# Patient Record
Sex: FEMALE | Race: WHITE | Employment: PART TIME | URBAN - METROPOLITAN AREA
[De-identification: names, ages, dates, MRNs, and addresses within clinical notes are randomized per-mention and may not be internally consistent; named-entity substitution may affect disease eponyms.]

---
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
## 2018-04-17 ENCOUNTER — OFFICE VISIT (OUTPATIENT)
Dept: DERMATOLOGY | Facility: CLINIC | Age: 18
End: 2018-04-17
Payer: COMMERCIAL

## 2018-04-17 DIAGNOSIS — L70.0 ACNE VULGARIS: Primary | ICD-10-CM

## 2018-04-17 PROCEDURE — 99213 OFFICE O/P EST LOW 20 MIN: CPT | Performed by: DERMATOLOGY

## 2018-04-17 RX ORDER — MINOCYCLINE HYDROCHLORIDE 100 MG/1
100 CAPSULE ORAL 2 TIMES DAILY
Qty: 220 CAPSULE | Refills: 0 | Status: SHIPPED | OUTPATIENT
Start: 2018-04-17 | End: 2018-08-16

## 2018-04-17 RX ORDER — AZELAIC ACID 0.15 G/G
GEL TOPICAL
Qty: 50 G | Refills: 11 | Status: SHIPPED | OUTPATIENT
Start: 2018-04-17 | End: 2018-06-22

## 2018-04-17 ASSESSMENT — PAIN SCALES - GENERAL: PAINLEVEL: NO PAIN (0)

## 2018-04-17 NOTE — PROGRESS NOTES
[facility] Dermatology Note      Dermatology Problem List:  1. Acne vulgaris, moderate, inflammatory  -s/p proactive with temporary improvement  -regular menses, currently on OCPs  -s/p doxycycline initiated 12/15/2015  -s/p Differin initiated 12/15/2015  -s/p BP wash initiated 12/15/2015  -s/p clindamycin initiated 12/15/2015  - tretinoin initiated 9/6/2016    Encounter Date: Apr 17, 2018    CC:  Chief Complaint   Patient presents with     Acne     OTC BP wash, retin-A       History of Present Illness:  Ms. Laurie Morales is a 17 year old female who presents as a follow-up for acne vulgaris. The patient was last seen 12/2/2016 when the patient was treated with tretinoin cream and BPO wash and accutane was discussed as she has already had a course of accutane. The patient used OTC BPO wash and retin A, but not frequently. The patient has used differin in the past, the patient reports that she is taking birth control now and was taking at the time she was seeing Dr. Clifford last time,, but took a break until now for The patient improved after taking doxy previously. No hx of depression, no suicide attempt. Face irritated by tretinoin and BP. The patient reports no other lesions of concern.       Past Medical History:   Patient Active Problem List   Diagnosis     Mild intermittent asthma     Impetigo     Goiter     Acne     Past Medical History:   Diagnosis Date     Nonsuppurative otitis media, not specified as acute or chronic 09/17/1999    Recurrent otitis media     Unspecified disease of respiratory system 09/17/1999    RAD     Past Surgical History:   Procedure Laterality Date     HC CREATE EARDRUM OPENING,GEN ANESTH      P.E. [id number]       Social History:  The patient is a student.      Family History:  There is no family history of skin cancer.  No family history of clotting disorder.   No family history of breast cancer.     Medications:  Current Outpatient Prescriptions   Medication Sig  Dispense Refill     VENTOLIN  (90 BASE) MCG/ACT Inhaler INHALE TWO PUFFS BY MOUTH EVERY 6 HOURS AS NEEDED FOR SHORTNESS OF BREATH 18 g 0     tretinoin (RETIN-A) 0.025 % cream Apply a thin layer once daily to the face 45 g 11     TRI-LO-ESTARYLLA 0.18/0.215/0.25 MG-25 MCG per tablet TAKE ONE TABLET BY MOUTH EVERY DAY 84 tablet 1     clindamycin (CLEOCIN T) 1 % solution Apply a thin layer to the face once daily in the am (Patient not taking: Reported on 4/17/2018) 60 mL 11     doxycycline Monohydrate 100 MG CAPS Take 1 capsule (100 mg) by mouth 2 times daily (Patient not taking: Reported on 4/17/2018) 180 capsule 0     adapalene (DIFFERIN) 0.1 % cream Apply a pea sized amount to the face nightly. (Patient not taking: Reported on 4/17/2018) 45 g 3       Allergies   Allergen Reactions     No Known Allergies          Review of Systems:.   -Skin: As above in HPI. No additional skin concerns.  -Constitutional: The patient is feeling generally well.     Physical exam:  There were no vitals taken for this visit.  GEN: This is a well developed, well-nourished female in no acute distress, in a pleasant mood.    SKIN: Acne exam, which includes the face, neck, upper central chest, and upper central back was performed.  - Acneiform papules and patches on the back, chest, and hairline and the peroral area.   -No other lesions of concern on areas examined.     Impression/Plan:  1. Acne vulgaris, moderate, inflammatory, s/p doxycycline but rebounded, away from  Home this summer, will go ahead with minocycline(declines doxy due to sun exposure) and plan to start accutane this fall. She is already on OCPs  Hold tretinoin 0.025% cream to face and clindamycin, irritating and not working  Start azaleic acid 15% cream once daily.   Continue OCPs  Start cerave or any oil free or non comedogenic make up/moisturizer   Start minocycline 100 mg BID after completing current course of antibiotics. Side effects of stomach upset, headache,  dizziness, and dyspigmentation were discussed.  Rare side effects such as allergic reactions, hepatitis and lupus-like syndrome were also discussed.  If patient experiences any unexplained symptoms of illness while taking minocycline, the medication is to be stopped and the prescribing physician contacted immediately.    Discussed need to stop medication and contact clinic if she were to become pregnant and that medication is pregnancy category D and can cause bony/teeth defects.   Introductory yellow brochure given.       Follow-up in 4 months for baseline accutane assessment, earlier for new or changing lesions.     Staff Involved:  Scribe/Staff    Scribe Disclosure:   I, Otilia Pereira, am serving as a scribe to document services personally performed by Dr. Parisa Clifford, based on data collection and the provider's statements to me.       Provider Disclosure:   The documentation recorded by the scribe accurately reflects the services I personally performed and the decisions made by me.    Parisa Clifford MD    Department of Dermatology  [facility]: Phone: [phone], Fax:[phone]  [facility]: Phone: [phone], Fax: [phone]

## 2018-04-17 NOTE — MR AVS SNAPSHOT
After Visit Summary   4/17/2018    Laurie Morales    MRN: [id number]           Patient Information     Date Of Birth          2000        Visit Information        Provider Department      4/17/2018 3:15 PM Parisa Clifford MD [facility]        Today's Diagnoses     Acne vulgaris    -  1      Care Instructions    Facial Moisturizing Lotion AM with SPF 30     Avobenzone, Octocrylene,-Dotson ingredients              Follow-ups after your visit        Follow-up notes from your care team     Return in about 4 months (around 8/17/2018) for plan for accutane start.      Your next 10 appointments already scheduled     Aug 24, 2018  8:30 AM CDT   [facility] Visit with Parias Clifford MD   [facility]    [address] [address] [place] [address] [phone]              Who to contact     If you have questions or need follow up information about today's clinic visit or your schedule please contact [facility] directly at [phone].  Normal or non-critical lab and imaging results will be communicated to you by MyChart, letter or phone within 4 business days after the clinic has received the results. If you do not hear from us within 7 days, please contact the clinic through [employer] or phone. If you have a critical or abnormal lab result, we will notify you by phone as soon as possible.  Submit refill requests through [employer] or call your pharmacy and they will forward the refill request to us. Please allow 3 business days for your refill to be completed.          Additional Information About Your Visit        MyChart Information     [employer] gives you secure access to your electronic health record. If you see a primary care provider, you can also send messages to your care team and make appointments. If you have questions, please call your primary care clinic.  If you do not have a primary care provider, please call [phone]  and they will assist you.      [employer] is an electronic gateway that provides easy, online access to your medical records. With [employer], you can request a clinic appointment, read your test results, renew a prescription or communicate with your care team.     To access your existing account, please contact your [facility] or call [phone] for assistance.        Care EveryWhere ID     This is your Care EveryWhere ID. This could be used by other organizations to access your [place] medical records  [id number]         Blood Pressure from Last 3 Encounters:   03/29/16 108/72   10/19/15 110/68   07/20/15 116/62    Weight from Last 3 Encounters:   03/29/16 53.5 kg (118 lb) (41 %)*   10/19/15 49.9 kg (109 lb 14.4 oz) (25 %)*   07/20/15 51.3 kg (113 lb) (33 %)*     * Growth percentiles are based on [facility] 2-20 Years data.              Today, you had the following     No orders found for display         Today's Medication Changes          These changes are accurate as of 4/17/18  3:45 PM.  If you have any questions, ask your nurse or doctor.               Start taking these medicines.        Dose/Directions    Azelaic Acid 15 % gel   Used for:  Acne vulgaris   Started by:  Parisa Clifford MD        Apply a thin layer to the face once daily.   Quantity:  50 g   Refills:  11       minocycline 100 MG capsule   Commonly known as:  MINOCIN/DYNACIN   Used for:  Acne vulgaris   Started by:  Parisa Clifford MD        Dose:  100 mg   Take 1 capsule (100 mg) by mouth 2 times daily   Quantity:  220 capsule   Refills:  0         Stop taking these medicines if you haven't already. Please contact your care team if you have questions.     clindamycin 1 % solution   Commonly known as:  CLEOCIN T   Stopped by:  Parisa Clifford MD           doxycycline monohydrate 100 MG capsule   Stopped by:  Parisa Clifford MD           tretinoin 0.025 % cream   Commonly known as:  RETIN-A   Stopped by:  Parisa Clifford MD                 Where to get your medicines      These medications were sent to [facility] - [place], [place] - [address], [address]  [address], [facility] [address]     Phone:  [phone]     Azelaic Acid 15 % gel    minocycline 100 MG capsule                Primary Care Provider Office Phone # Fax #    sIidra Bullard, PADDY [phone] [address] [place] [address]        Equal Access to Services     [facility]: Hadii aad ku hadasho Soomaali, waaxda luqadaha, qaybta kaalmada adeegyada, waxay idiin hayaan adeeg kharash lanichole . So [facility] [phone].    ATENCIÓN: Si habla español, tiene a harrison disposición servicios gratuitos de asistencia lingüística. [facility] [phone].    We comply with applicable federal civil rights laws and [place] laws. We do not discriminate on the basis of race, color, national origin, age, disability, sex, sexual orientation, or gender identity.            Thank you!     Thank you for choosing [facility]  for your care. Our goal is always to provide you with excellent care. Hearing back from our patients is one way we can continue to improve our services. Please take a few minutes to complete the written survey that you may receive in the mail after your visit with us. Thank you!             Your Updated Medication List - Protect others around you: Learn how to safely use, store and throw away your medicines at www.disposemymeds.org.          This list is accurate as of 4/17/18  3:45 PM.  Always use your most recent med list.                   Brand Name Dispense Instructions for use Diagnosis    adapalene 0.1 % cream    DIFFERIN    45 g    Apply a pea sized amount to the face nightly.    Acne vulgaris       Azelaic Acid 15 % gel     50 g    Apply a thin layer to the face once daily.    Acne vulgaris       minocycline 100 MG capsule    MINOCIN/DYNACIN    220 capsule    Take 1 capsule (100 mg) by mouth  2 times daily    Acne vulgaris       TRI-LO-ESTARYLLA 0.18/0.215/0.25 MG-25 MCG per tablet   Generic drug:  norgestim-eth estrad triphasic     84 tablet    TAKE ONE TABLET BY MOUTH EVERY DAY    Acne vulgaris       VENTOLIN  (90 Base) MCG/ACT Inhaler   Generic drug:  albuterol     18 g    INHALE TWO PUFFS BY MOUTH EVERY 6 HOURS AS NEEDED FOR SHORTNESS OF BREATH    Mild intermittent asthma without complication

## 2018-04-17 NOTE — NURSING NOTE
Dermatology Rooming Note    Laurie Morales's goals for this visit include:   Chief Complaint   Patient presents with     Acne     OTC BP wash, retin-A       Is a scribe okay for this visit:YES    Are records needed for this visit(If yes, obtain release of information): No     Vitals: There were no vitals taken for this visit.    Referring Provider:  No referring provider defined for this encounter.    Shanita Alvarado LPN

## 2018-04-24 ENCOUNTER — TELEPHONE (OUTPATIENT)
Dept: DERMATOLOGY | Facility: CLINIC | Age: 18
End: 2018-04-24

## 2018-04-24 DIAGNOSIS — L70.0 ACNE VULGARIS: ICD-10-CM

## 2018-04-24 RX ORDER — CLINDAMYCIN PHOSPHATE 11.9 MG/ML
SOLUTION TOPICAL
Qty: 60 ML | Refills: 11 | Status: SHIPPED | OUTPATIENT
Start: 2018-04-24 | End: 2020-08-27

## 2018-04-24 NOTE — TELEPHONE ENCOUNTER
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  Ashley Stevens, [facility]  [phone]

## 2018-04-24 NOTE — TELEPHONE ENCOUNTER
Disp Refills Start End ANGIE   clindamycin (CLEOCIN T) 1 % external solution (Discontinued) 60 mL 11 12/15/2015 3/15/2016 --   Sig: Apply a thin layer to the face once daily in the am   Class: E-Prescribe     Ok for rn to refill

## 2018-06-18 ENCOUNTER — TELEPHONE (OUTPATIENT)
Dept: FAMILY MEDICINE | Facility: OTHER | Age: 18
End: 2018-06-18

## 2018-06-18 NOTE — TELEPHONE ENCOUNTER
Pt is scheduled to see Airam Aleisha on 6/21/18, pt has never seen provider and she is not est with provider either. Due to closed practice pt needs to be rescheduled. Will route to provider's team to reach out to pt as this was scheduled via [employer].    Sarina Jacobs CMA ([facility])

## 2018-06-19 NOTE — PROGRESS NOTES
SUBJECTIVE:   Laurie Morales is a 17 year old female who presents to clinic today for the following health issues:      HPI     Discuss reoccurring UTIs and yeast infections  Did finish antibiotics on Wednesday and feels like UTI is gone but maybe getting a yeast infection-is having some itching and then prior to menstrual cycle starting Wednesday did notice discharge that was not normal to her.  No odor.  Has has multiple uti's and subsequent yeast infections.  Worried about traveling to see boyfriend and having another uti.        Problem list and histories reviewed & adjusted, as indicated.  Additional history: as documented      Patient Active Problem List   Diagnosis     Mild intermittent asthma     Impetigo     Goiter     Acne     Past Surgical History:   Procedure Laterality Date     HC CREATE EARDRUM OPENING,GEN ANESTH      P.E. [id number]       Social History   Substance Use Topics     Smoking status: Never Smoker     Smokeless tobacco: Never Used     Alcohol use No     Family History   Problem Relation Age of Onset     Cancer Paternal Grandfather      prostate     HEART DISEASE Maternal Grandfather      CAB age 63     Cerebrovascular Disease Maternal Grandfather      Anesthesia Reaction No family hx of          Current Outpatient Prescriptions   Medication Sig Dispense Refill     clindamycin (CLEOCIN T) 1 % solution Apply a thin layer to the face once daily in the am 60 mL 11     terconazole (TERAZOL 7) 0.4 % cream Place 1 applicatorful in vagina qhs x 7 days 45 g 0     TRI-LO-ESTARYLLA 0.18/0.215/0.25 MG-25 MCG per tablet TAKE ONE TABLET BY MOUTH EVERY DAY 84 tablet 1     VENTOLIN  (90 BASE) MCG/ACT Inhaler INHALE TWO PUFFS BY MOUTH EVERY 6 HOURS AS NEEDED FOR SHORTNESS OF BREATH 18 g 0     adapalene (DIFFERIN) 0.1 % cream Apply a pea sized amount to the face nightly. (Patient not taking: Reported on 6/22/2018) 45 g 3     minocycline (MINOCIN/DYNACIN) 100 MG capsule Take 1 capsule (100 mg) by  "mouth 2 times daily (Patient not taking: Reported on 6/22/2018) 220 capsule 0     Allergies   Allergen Reactions     No Known Allergies      BP Readings from Last 3 Encounters:   06/22/18 112/60   03/29/16 108/72   10/19/15 110/68    Wt Readings from Last 3 Encounters:   06/22/18 119 lb (54 kg) (33 %)*   03/29/16 118 lb (53.5 kg) (41 %)*   10/19/15 109 lb 14.4 oz (49.9 kg) (25 %)*     * Growth percentiles are based on [facility] 2-20 Years data.             Discussed with patient different risk factors for uti's and discussed ways of helping to prevent them including increased water intake, vit c supplementation daily, voiding after intercourse. Also discussed with patient options of homeopathic medications for uti's to take when traveling. Discussed treatments and causes of yeast infections including causes such as antibiotic use. Total face to face discussion time was 20 minutes.          ROS:  CONSTITUTIONAL: NEGATIVE for fever, chills, change in weight  ENT/MOUTH: NEGATIVE for ear, mouth and throat problems  RESP: NEGATIVE for significant cough or SOB  : normal menstrual cycles, vaginal itching    OBJECTIVE:     /60  Pulse 72  Temp 98.6  F (37  C) (Temporal)  Resp 12  Ht 5' 5.25\" (1.657 m)  Wt 119 lb (54 kg)  LMP 06/20/2018  BMI 19.65 kg/m2  Body mass index is 19.65 kg/(m^2).     Well appearing female in nad  Remainder of exam deferred      ASSESSMENT/PLAN:     17 y.o. G0 with history of frequent uti's, now s/p 20 minutes of counseling regarding etiologies and prevention. History consistent with vaginal candidiasis after antibiotic use. Rx for terazol given. RTC prn and in 1 year. STD testing today for GC/C.      Amanda Carrillo MD  [facility]  "

## 2018-06-22 ENCOUNTER — OFFICE VISIT (OUTPATIENT)
Dept: OBGYN | Facility: OTHER | Age: 18
End: 2018-06-22
Payer: COMMERCIAL

## 2018-06-22 VITALS
TEMPERATURE: 98.6 F | DIASTOLIC BLOOD PRESSURE: 60 MMHG | SYSTOLIC BLOOD PRESSURE: 112 MMHG | RESPIRATION RATE: 12 BRPM | HEIGHT: 65 IN | BODY MASS INDEX: 19.83 KG/M2 | WEIGHT: 119 LBS | HEART RATE: 72 BPM

## 2018-06-22 DIAGNOSIS — Z11.3 SCREEN FOR STD (SEXUALLY TRANSMITTED DISEASE): Primary | ICD-10-CM

## 2018-06-22 DIAGNOSIS — B37.31 CANDIDIASIS OF VAGINA: ICD-10-CM

## 2018-06-22 PROCEDURE — 99202 OFFICE O/P NEW SF 15 MIN: CPT | Performed by: OBSTETRICS & GYNECOLOGY

## 2018-06-22 PROCEDURE — 87591 N.GONORRHOEAE DNA AMP PROB: CPT | Performed by: OBSTETRICS & GYNECOLOGY

## 2018-06-22 PROCEDURE — 87491 CHLMYD TRACH DNA AMP PROBE: CPT | Performed by: OBSTETRICS & GYNECOLOGY

## 2018-06-22 RX ORDER — TERCONAZOLE 0.4 %
CREAM WITH APPLICATOR VAGINAL
Qty: 45 G | Refills: 0 | Status: SHIPPED | OUTPATIENT
Start: 2018-06-22 | End: 2020-08-27

## 2018-06-22 ASSESSMENT — PAIN SCALES - GENERAL: PAINLEVEL: NO PAIN (0)

## 2018-06-22 NOTE — MR AVS SNAPSHOT
After Visit Summary   6/22/2018    Laurie Morales    MRN: [id number]           Patient Information     Date Of Birth          2000        Visit Information        Provider Department      6/22/2018 11:30 AM Amanda Carrillo MD [facility]        Today's Diagnoses     Screen for STD (sexually transmitted disease)    -  1    Candidiasis of vagina           Follow-ups after your visit        Your next 10 appointments already scheduled     Aug 16, 2018  2:30 PM CDT   Return Visit with Parisa Clifford MD   [facility] ([facility])    [address] [place] [address] [phone]              Who to contact     If you have questions or need follow up information about today's clinic visit or your schedule please contact [facility] directly at [phone].  Normal or non-critical lab and imaging results will be communicated to you by MyChart, letter or phone within 4 business days after the clinic has received the results. If you do not hear from us within 7 days, please contact the clinic through [employer] or phone. If you have a critical or abnormal lab result, we will notify you by phone as soon as possible.  Submit refill requests through [employer] or call your pharmacy and they will forward the refill request to us. Please allow 3 business days for your refill to be completed.          Additional Information About Your Visit        MyChart Information     [employer] gives you secure access to your electronic health record. If you see a primary care provider, you can also send messages to your care team and make appointments. If you have questions, please call your primary care clinic.  If you do not have a primary care provider, please call [phone] and they will assist you.        Care EveryWhere ID     This is your Care EveryWhere ID. This could be used by other organizations to access your [facility]  "records  [id number]        Your Vitals Were     Pulse Temperature Respirations Height Last Period BMI (Body Mass Index)    72 98.6  F (37  C) (Temporal) 12 5' 5.25\" (1.657 m) 06/20/2018 19.65 kg/m2       Blood Pressure from Last 3 Encounters:   06/22/18 112/60   03/29/16 108/72   10/19/15 110/68    Weight from Last 3 Encounters:   06/22/18 119 lb (54 kg) (33 %)*   03/29/16 118 lb (53.5 kg) (41 %)*   10/19/15 109 lb 14.4 oz (49.9 kg) (25 %)*     * Growth percentiles are based on [facility] 2-20 Years data.              We Performed the Following     Chlamydia trachomatis PCR     Neisseria gonorrhoeae PCR          Today's Medication Changes          These changes are accurate as of 6/22/18  2:06 PM.  If you have any questions, ask your nurse or doctor.               Start taking these medicines.        Dose/Directions    terconazole 0.4 % cream   Commonly known as:  TERAZOL 7   Used for:  Candidiasis of vagina   Started by:  Amanda Carrillo MD        Place 1 applicatorful in vagina qhs x 7 days   Quantity:  45 g   Refills:  0            Where to get your medicines      These medications were sent to [facility] - [address]   [address] [place] [address]     Phone:  [phone]     terconazole 0.4 % cream                Primary Care Provider Fax #    Physician No Ref-Primary [phone]       No address on file        Equal Access to Services     [facility]: Hadii aad ku hadasho Sotoñoali, waaxda luqadaha, qaybta kaalmada adeegyada, bing diaz. So [facility] [phone].    ATENCIÓN: Si habla español, tiene a harrison disposición servicios gratuitos de asistencia lingüística. Llame al [phone].    We comply with applicable federal civil rights laws and [place] laws. We do not discriminate on the basis of race, color, national origin, age, disability, sex, sexual orientation, or gender identity.            Thank you!     Thank you for " choosing [facility]  for your care. Our goal is always to provide you with excellent care. Hearing back from our patients is one way we can continue to improve our services. Please take a few minutes to complete the written survey that you may receive in the mail after your visit with us. Thank you!             Your Updated Medication List - Protect others around you: Learn how to safely use, store and throw away your medicines at www.disposemymeds.org.          This list is accurate as of 6/22/18  2:06 PM.  Always use your most recent med list.                   Brand Name Dispense Instructions for use Diagnosis    adapalene 0.1 % cream    DIFFERIN    45 g    Apply a pea sized amount to the face nightly.    Acne vulgaris       clindamycin 1 % solution    CLEOCIN T    60 mL    Apply a thin layer to the face once daily in the am    Acne vulgaris       minocycline 100 MG capsule    MINOCIN/DYNACIN    220 capsule    Take 1 capsule (100 mg) by mouth 2 times daily    Acne vulgaris       terconazole 0.4 % cream    TERAZOL 7    45 g    Place 1 applicatorful in vagina qhs x 7 days    Candidiasis of vagina       TRI-LO-ESTARYLLA 0.18/0.215/0.25 MG-25 MCG per tablet   Generic drug:  norgestim-eth estrad triphasic     84 tablet    TAKE ONE TABLET BY MOUTH EVERY DAY    Acne vulgaris       VENTOLIN  (90 Base) MCG/ACT Inhaler   Generic drug:  albuterol     18 g    INHALE TWO PUFFS BY MOUTH EVERY 6 HOURS AS NEEDED FOR SHORTNESS OF BREATH    Mild intermittent asthma without complication

## 2018-06-23 ASSESSMENT — ASTHMA QUESTIONNAIRES: ACT_TOTALSCORE: 25

## 2018-06-24 LAB
C TRACH DNA SPEC QL NAA+PROBE: NEGATIVE
N GONORRHOEA DNA SPEC QL NAA+PROBE: NEGATIVE
SPECIMEN SOURCE: NORMAL
SPECIMEN SOURCE: NORMAL

## 2018-06-27 ENCOUNTER — TELEPHONE (OUTPATIENT)
Dept: OBGYN | Facility: OTHER | Age: 18
End: 2018-06-27

## 2018-06-27 NOTE — TELEPHONE ENCOUNTER
Notes Recorded by Guillermo James RN on 6/27/2018 at 9:13 AM  Huddled with Dr. Rivera.  Results are normal. Please call patient.  Guillermo James RN, BSN    Pt informed and no questions/concerns.    Sarina Jacobs CMA ([facility])

## 2018-08-16 ENCOUNTER — OFFICE VISIT (OUTPATIENT)
Dept: DERMATOLOGY | Facility: CLINIC | Age: 18
End: 2018-08-16
Payer: COMMERCIAL

## 2018-08-16 VITALS — WEIGHT: 118.5 LBS | BODY MASS INDEX: 19.57 KG/M2

## 2018-08-16 DIAGNOSIS — L70.0 ACNE VULGARIS: ICD-10-CM

## 2018-08-16 DIAGNOSIS — Z76.89 ENCOUNTER PRIOR TO INITIATION OF MEDICATION: Primary | ICD-10-CM

## 2018-08-16 LAB — BETA HCG QUAL IFA URINE: NEGATIVE

## 2018-08-16 PROCEDURE — 99213 OFFICE O/P EST LOW 20 MIN: CPT | Performed by: DERMATOLOGY

## 2018-08-16 PROCEDURE — 84703 CHORIONIC GONADOTROPIN ASSAY: CPT | Performed by: DERMATOLOGY

## 2018-08-16 NOTE — LETTER
8/16/2018         RE: Laurie Morales  [address] [facility] [address]        Dear Colleague,    Thank you for referring your patient, Laurie Morales, to the [facility]. Please see a copy of my visit note below.    [facility] Dermatology Note      Dermatology Problem List:  1. Acne vulgaris, moderate, inflammatory  -s/p proactive with temporary improvement  -regular menses, currently on OCPs  -minocycline spring 2018  -s/p doxycycline initiated 12/15/2015   -s/p Differin initiated 12/15/2015  -s/p BP wash initiated 12/15/2015  -s/p clindamycin initiated 12/15/2015  - tretinoin initiated 9/6/2016    Encounter Date: Aug 16, 2018    CC:  Chief Complaint   Patient presents with     Acne     Accutane start - menses started Wensday       History of Present Illness:  Ms. Laurie Morales is a 17 year old female who presents as a follow-up for acne vulgaris. The patient was last seen 4/17/18 at which time she started minocycline to treat her acne. Today, the patient reports that she wants to start accutane. She finished the course of minocycline and no longer uses Differin. She still uses birth control. She will use condoms as second protection. No hx of depression. Is sexually active. NO history of SI, Mood is good. No other skin concerns to be addressed.      Past Medical History:   Patient Active Problem List   Diagnosis     Mild intermittent asthma     Impetigo     Goiter     Acne     Past Medical History:   Diagnosis Date     Nonsuppurative otitis media, not specified as acute or chronic 09/17/1999    Recurrent otitis media     Unspecified disease of respiratory system 09/17/1999    RAD     Past Surgical History:   Procedure Laterality Date     HC CREATE EARDRUM OPENING,GEN ANESTH      P.E. [id number]       Social History:  Social History     Social History     Marital status: Single     Spouse name: N/A     Number of children: N/A     Years of education: N/A      Occupational History     Not on file.     Social History Main Topics     Smoking status: Never Smoker     Smokeless tobacco: Never Used     Alcohol use No     Drug use: No     Sexual activity: Not Currently     Partners: Male     Birth control/ protection: Pill, Condom     Other Topics Concern     Bike Helmet Yes     Seat Belt Yes     Social History Narrative     The patient is a student.  Studies geography.     Family History:  Family History   Problem Relation Age of Onset     Cancer Paternal Grandfather      prostate     HEART DISEASE Maternal Grandfather      CAB age 63     Cerebrovascular Disease Maternal Grandfather      Anesthesia Reaction No family hx of      There is no family history of skin cancer.  No family history of clotting disorder.   No family history of breast cancer.   No fam history of psychiatric disease.     Medications:  Current Outpatient Prescriptions   Medication Sig Dispense Refill     adapalene (DIFFERIN) 0.1 % cream Apply a pea sized amount to the face nightly. (Patient not taking: Reported on 6/22/2018) 45 g 3     clindamycin (CLEOCIN T) 1 % solution Apply a thin layer to the face once daily in the am 60 mL 11     minocycline (MINOCIN/DYNACIN) 100 MG capsule Take 1 capsule (100 mg) by mouth 2 times daily (Patient not taking: Reported on 6/22/2018) 220 capsule 0     terconazole (TERAZOL 7) 0.4 % cream Place 1 applicatorful in vagina qhs x 7 days 45 g 0     TRI-LO-ESTARYLLA 0.18/0.215/0.25 MG-25 MCG per tablet TAKE ONE TABLET BY MOUTH EVERY DAY 84 tablet 1     VENTOLIN  (90 BASE) MCG/ACT Inhaler INHALE TWO PUFFS BY MOUTH EVERY 6 HOURS AS NEEDED FOR SHORTNESS OF BREATH 18 g 0       Allergies   Allergen Reactions     No Known Allergies          Review of Systems:.   -Skin: As above in HPI. No additional skin concerns.  -Constitutional: The patient is feeling generally well.     Physical exam:  Wt 53.8 kg (118 lb 8 oz)  BMI 19.57 kg/m2  GEN: This is a well developed,  well-nourished female in no acute distress, in a pleasant mood.    SKIN: Acne exam, which includes the face, neck, upper central chest, and upper central back was performed  -Upper chest w/ upper comedone  -Acneiform scarring around the perioral area  -About 20 acneiform papules on the upper back  -Some open comedones on the face  -No other lesions of concern on areas examined.     Impression/Plan:  1. Acne vulgaris, moderate, inflammatory. Patient still wants to start Accutane, has failed oral antibiotics and OCPs. Has scarring  Discussion of the risks and side effects of Accutane including but not limited to mucocutaneous dryness, arthralgias, myalgias, depression, suicidal ideation, headache, blurred vision,  increase in liver function tests, increase in lipids, and teratogenic effects. Discussed need for two forms of contraception. The ipledgeprogram brochure was provided and the contents discussed with the patient. The patient was counseled they cannot give blood while on Accutane. No personal or family history of inflammatory bowel disease or hypertriglyceridemia known to patient. Reviewed need to avoid alcohol on medication.   Ipledge program consent was obtained. Patient counseled that if they wear contacts eye may become too dry to tolerate. Recommend follow up with eye doctor.   At this visit we will begin Accutane 20 mg daily.  One month supply with no refills provided.  Goal dose is 6456 to 8070 mg for 120-150 mg/kg dosing in this 53.8 kg patient.    Baseline labs including CBC, BUN/Cr, fasting lipids and alt/ast will be obtained.     Baseline pregnancy test today. The patients two forms of contraception are birth control  and condoms.     Total cumulative dose 0 mg.   Patient's I-pledge # is [phone]  The patient will stop all acne medications       Follow-up in 30 days, earlier for new or changing lesions.     Staff Involved:  Scribe/Staff    Scribe Disclosure  I, Gerber Patrick, am serving as a scribe  to document services personally performed by Dr. Parisa Clifford MD, based on data collection and the provider's statements to me.          Provider Disclosure:   The documentation recorded by the scribe accurately reflects the services I personally performed and the decisions made by me.    Parisa Clifford MD    Department of Dermatology  [facility]: Phone: [phone], Fax:[phone]  [facility]: Phone: [phone], Fax: [phone]        Again, thank you for allowing me to participate in the care of your patient.        Sincerely,        Parisa Clifford MD

## 2018-08-16 NOTE — NURSING NOTE
@Laurie Morales's goals for this visit include:   Chief Complaint   Patient presents with     Acne     Accutane start - menses started Wensday       She requests these members of her care team be copied on today's visit information: NO    PCP: No Ref-Primary, Physician    Referring Provider:  No referring provider defined for this encounter.    There were no vitals taken for this visit.    Do you need any medication refills at today's visit? NO    Laxmi Hawkins, [facility]

## 2018-08-16 NOTE — MR AVS SNAPSHOT
After Visit Summary   8/16/2018    Laurie Morales    MRN: [id number]           Patient Information     Date Of Birth          2000        Visit Information        Provider Department      8/16/2018 2:30 PM Parisa Clifford MD [facility]        Today's Diagnoses     Encounter prior to initiation of medication    -  1    Acne vulgaris           Follow-ups after your visit        Follow-up notes from your care team     Return in about 30 months (around 2/16/2021) for Accutane.      Future tests that were ordered for you today     Open Standing Orders        Priority Remaining Interval Expires Ordered    Beta HCG qual IFA urine Routine 7/8 8/16/2019 8/16/2018          Open Future Orders        Priority Expected Expires Ordered    CBC with platelets differential Routine  8/16/2019 8/16/2018    Comprehensive metabolic panel Routine  8/16/2019 8/16/2018    Lipid Profile Routine  8/16/2019 8/16/2018            Who to contact     If you have questions or need follow up information about today's clinic visit or your schedule please contact [facility] directly at [phone].  Normal or non-critical lab and imaging results will be communicated to you by [employer], letter or phone within 4 business days after the clinic has received the results. If you do not hear from us within 7 days, please contact the clinic through [employer] or phone. If you have a critical or abnormal lab result, we will notify you by phone as soon as possible.  Submit refill requests through [employer] or call your pharmacy and they will forward the refill request to us. Please allow 3 business days for your refill to be completed.          Additional Information About Your Visit        [employer] Information     [employer] gives you secure access to your electronic health record. If you see a primary care provider, you can also send messages to your care team and make appointments. If you have questions, please  call your primary care clinic.  If you do not have a primary care provider, please call [phone] and they will assist you.      [employer] is an electronic gateway that provides easy, online access to your medical records. With [employer], you can request a clinic appointment, read your test results, renew a prescription or communicate with your care team.     To access your existing account, please contact your [facility] or call [phone] for assistance.        Care EveryWhere ID     This is your Care EveryWhere ID. This could be used by other organizations to access your [facility] medical records  [id number]        Your Vitals Were     BMI (Body Mass Index)                   19.57 kg/m2            Blood Pressure from Last 3 Encounters:   06/22/18 112/60   03/29/16 108/72   10/19/15 110/68    Weight from Last 3 Encounters:   08/16/18 53.8 kg (118 lb 8 oz) (31 %)*   06/22/18 54 kg (119 lb) (33 %)*   03/29/16 53.5 kg (118 lb) (41 %)*     * Growth percentiles are based on [facility] 2-20 Years data.              We Performed the Following     Beta HCG qual IFA urine          Today's Medication Changes          These changes are accurate as of 8/16/18  4:40 PM.  If you have any questions, ask your nurse or doctor.               Stop taking these medicines if you haven't already. Please contact your care team if you have questions.     adapalene 0.1 % cream   Commonly known as:  DIFFERIN           minocycline 100 MG capsule   Commonly known as:  MINOCIN/DYNACIN                    Primary Care Provider Fax #    Physician No Ref-Primary [phone]       No address on file        Equal Access to Services     ELISABET DOE : Hadii milad valle Sorick, waaxda luqadaha, qaybta kaalmada bing holm . So [facility] [place] [phone].    ATENCIÓN: Si habla español, tiene a harrison disposición servicios gratuitos de asistencia lingüística. Llame al [phone].    We comply  with applicable federal civil rights laws and [place] laws. We do not discriminate on the basis of race, color, national origin, age, disability, sex, sexual orientation, or gender identity.            Thank you!     Thank you for choosing [facility]  for your care. Our goal is always to provide you with excellent care. Hearing back from our patients is one way we can continue to improve our services. Please take a few minutes to complete the written survey that you may receive in the mail after your visit with us. Thank you!             Your Updated Medication List - Protect others around you: Learn how to safely use, store and throw away your medicines at www.disposemymeds.org.          This list is accurate as of 8/16/18  4:40 PM.  Always use your most recent med list.                   Brand Name Dispense Instructions for use Diagnosis    clindamycin 1 % solution    CLEOCIN T    60 mL    Apply a thin layer to the face once daily in the am    Acne vulgaris       terconazole 0.4 % cream    TERAZOL 7    45 g    Place 1 applicatorful in vagina qhs x 7 days    Candidiasis of vagina       TRI-LO-ESTARYLLA 0.18/0.215/0.25 MG-25 MCG per tablet   Generic drug:  norgestim-eth estrad triphasic     84 tablet    TAKE ONE TABLET BY MOUTH EVERY DAY    Acne vulgaris       VENTOLIN  (90 Base) MCG/ACT inhaler   Generic drug:  albuterol     18 g    INHALE TWO PUFFS BY MOUTH EVERY 6 HOURS AS NEEDED FOR SHORTNESS OF BREATH    Mild intermittent asthma without complication

## 2018-08-16 NOTE — LETTER
Patient:  Laurie Morales  :   2000  MRN:     [id number]        Ms.Caitlin TYRON Morales  [address] [address] [place] [id number]        2018    Dear ,    We are writing to inform you of your test results that show negative pregnancy test.     Thank you for taking the time to be seen in our dermatology clinic. If you have further questions or concerns, please contact the clinic(see phone number listed below).       Sincerely,     Parisa Clifford MD      Department of Dermatology   [facility]: Phone: [phone], Fax:[phone]   [facility]: Phone: [phone], Fax: [phone]     Resulted Orders   Beta HCG qual IFA urine   Result Value Ref Range    Beta HCG Qual IFA Urine Negative NEG^Negative

## 2019-01-29 NOTE — PROGRESS NOTES
[facility] Dermatology Note      Dermatology Problem List:  1. Acne vulgaris, moderate, inflammatory  -s/p proactive with temporary improvement  -regular menses, currently on OCPs  -minocycline spring 2018  -s/p doxycycline initiated 12/15/2015   -s/p Differin initiated 12/15/2015  -s/p BP wash initiated 12/15/2015  -s/p clindamycin initiated 12/15/2015  - tretinoin initiated 9/6/2016    Encounter Date: Aug 16, 2018    CC:  Chief Complaint   Patient presents with     Acne     Accutane start - menses started Wensday       History of Present Illness:  Ms. Laurie Morales is a 17 year old female who presents as a follow-up for acne vulgaris. The patient was last seen 4/17/18 at which time she started minocycline to treat her acne. Today, the patient reports that she wants to start accutane. She finished the course of minocycline and no longer uses Differin. She still uses birth control. She will use condoms as second protection. No hx of depression. Is sexually active. NO history of SI, Mood is good. No other skin concerns to be addressed.      Past Medical History:   Patient Active Problem List   Diagnosis     Mild intermittent asthma     Impetigo     Goiter     Acne     Past Medical History:   Diagnosis Date     Nonsuppurative otitis media, not specified as acute or chronic 09/17/1999    Recurrent otitis media     Unspecified disease of respiratory system 09/17/1999    RAD     Past Surgical History:   Procedure Laterality Date     HC CREATE EARDRUM OPENING,GEN ANESTH      P.E. [id number]       Social History:  Social History     Social History     Marital status: Single     Spouse name: N/A     Number of children: N/A     Years of education: N/A     Occupational History     Not on file.     Social History Main Topics     Smoking status: Never Smoker     Smokeless tobacco: Never Used     Alcohol use No     Drug use: No     Sexual activity: Not Currently     Partners: Male     Birth control/  protection: Pill, Condom     Other Topics Concern     Bike Helmet Yes     Seat Belt Yes     Social History Narrative     The patient is a student.  Studies geography.     Family History:  Family History   Problem Relation Age of Onset     Cancer Paternal Grandfather      prostate     HEART DISEASE Maternal Grandfather      CAB age 63     Cerebrovascular Disease Maternal Grandfather      Anesthesia Reaction No family hx of      There is no family history of skin cancer.  No family history of clotting disorder.   No family history of breast cancer.   No fam history of psychiatric disease.     Medications:  Current Outpatient Prescriptions   Medication Sig Dispense Refill     adapalene (DIFFERIN) 0.1 % cream Apply a pea sized amount to the face nightly. (Patient not taking: Reported on 6/22/2018) 45 g 3     clindamycin (CLEOCIN T) 1 % solution Apply a thin layer to the face once daily in the am 60 mL 11     minocycline (MINOCIN/DYNACIN) 100 MG capsule Take 1 capsule (100 mg) by mouth 2 times daily (Patient not taking: Reported on 6/22/2018) 220 capsule 0     terconazole (TERAZOL 7) 0.4 % cream Place 1 applicatorful in vagina qhs x 7 days 45 g 0     TRI-LO-ESTARYLLA 0.18/0.215/0.25 MG-25 MCG per tablet TAKE ONE TABLET BY MOUTH EVERY DAY 84 tablet 1     VENTOLIN  (90 BASE) MCG/ACT Inhaler INHALE TWO PUFFS BY MOUTH EVERY 6 HOURS AS NEEDED FOR SHORTNESS OF BREATH 18 g 0       Allergies   Allergen Reactions     No Known Allergies          Review of Systems:.   -Skin: As above in HPI. No additional skin concerns.  -Constitutional: The patient is feeling generally well.     Physical exam:  Wt 53.8 kg (118 lb 8 oz)  BMI 19.57 kg/m2  GEN: This is a well developed, well-nourished female in no acute distress, in a pleasant mood.    SKIN: Acne exam, which includes the face, neck, upper central chest, and upper central back was performed  -Upper chest w/ upper comedone  -Acneiform scarring around the perioral area  -About  20 acneiform papules on the upper back  -Some open comedones on the face  -No other lesions of concern on areas examined.     Impression/Plan:  1. Acne vulgaris, moderate, inflammatory. Patient still wants to start Accutane, has failed oral antibiotics and OCPs. Has scarring  Discussion of the risks and side effects of Accutane including but not limited to mucocutaneous dryness, arthralgias, myalgias, depression, suicidal ideation, headache, blurred vision,  increase in liver function tests, increase in lipids, and teratogenic effects. Discussed need for two forms of contraception. The ipledgeprogram brochure was provided and the contents discussed with the patient. The patient was counseled they cannot give blood while on Accutane. No personal or family history of inflammatory bowel disease or hypertriglyceridemia known to patient. Reviewed need to avoid alcohol on medication.   Ipledge program consent was obtained. Patient counseled that if they wear contacts eye may become too dry to tolerate. Recommend follow up with eye doctor.   At this visit we will begin Accutane 20 mg daily.  One month supply with no refills provided.  Goal dose is 6456 to 8070 mg for 120-150 mg/kg dosing in this 53.8 kg patient.    Baseline labs including CBC, BUN/Cr, fasting lipids and alt/ast will be obtained.     Baseline pregnancy test today. The patients two forms of contraception are birth control  and condoms.     Total cumulative dose 0 mg.   Patient's I-pledge # is [id number]  The patient will stop all acne medications       Follow-up in 30 days, earlier for new or changing lesions.     Staff Involved:  Scribe/Staff    Scribe Disclosure  I, Gerber Patrick, am serving as a scribe to document services personally performed by Dr. Parisa Clifford MD, based on data collection and the provider's statements to me.          Provider Disclosure:   The documentation recorded by the scribe accurately reflects the services I personally performed  and the decisions made by me.    Parisa Clifford MD    Department of Dermatology  [facility]: Phone: [phone], Fax:[phone]  [facility]: Phone: [phone], Fax: [phone]       TOMEKA:'[id number]'

## 2019-10-24 ENCOUNTER — OFFICE VISIT (OUTPATIENT)
Dept: DERMATOLOGY | Facility: CLINIC | Age: 19
End: 2019-10-24
Payer: COMMERCIAL

## 2019-10-24 DIAGNOSIS — L70.0 ACNE VULGARIS: Primary | ICD-10-CM

## 2019-10-24 DIAGNOSIS — L70.0 ACNE VULGARIS: ICD-10-CM

## 2019-10-24 DIAGNOSIS — Z51.81 MEDICATION MONITORING ENCOUNTER: ICD-10-CM

## 2019-10-24 LAB
ALBUMIN SERPL-MCNC: 3.8 G/DL (ref 3.4–5)
ALP SERPL-CCNC: 41 U/L (ref 40–150)
ALT SERPL W P-5'-P-CCNC: 16 U/L (ref 0–50)
ANION GAP SERPL CALCULATED.3IONS-SCNC: 8 MMOL/L (ref 3–14)
AST SERPL W P-5'-P-CCNC: 10 U/L (ref 0–45)
B-HCG SERPL-ACNC: <1 IU/L (ref 0–5)
BASOPHILS # BLD AUTO: 0 10E9/L (ref 0–0.2)
BASOPHILS NFR BLD AUTO: 0.4 %
BILIRUB SERPL-MCNC: 0.3 MG/DL (ref 0.2–1.3)
BUN SERPL-MCNC: 9 MG/DL (ref 7–30)
CALCIUM SERPL-MCNC: 8.9 MG/DL (ref 8.5–10.1)
CHLORIDE SERPL-SCNC: 106 MMOL/L (ref 94–109)
CHOLEST SERPL-MCNC: 170 MG/DL
CO2 SERPL-SCNC: 24 MMOL/L (ref 20–32)
CREAT SERPL-MCNC: 0.75 MG/DL (ref 0.52–1.04)
DIFFERENTIAL METHOD BLD: NORMAL
EOSINOPHIL # BLD AUTO: 0.1 10E9/L (ref 0–0.7)
EOSINOPHIL NFR BLD AUTO: 0.7 %
ERYTHROCYTE [DISTWIDTH] IN BLOOD BY AUTOMATED COUNT: 14.5 % (ref 10–15)
GFR SERPL CREATININE-BSD FRML MDRD: >90 ML/MIN/{1.73_M2}
GLUCOSE SERPL-MCNC: 88 MG/DL (ref 70–99)
HCG UR QL: NEGATIVE
HCT VFR BLD AUTO: 39.1 % (ref 35–47)
HDLC SERPL-MCNC: 75 MG/DL
HGB BLD-MCNC: 12.3 G/DL (ref 11.7–15.7)
IMM GRANULOCYTES # BLD: 0 10E9/L (ref 0–0.4)
IMM GRANULOCYTES NFR BLD: 0.1 %
LDLC SERPL CALC-MCNC: 78 MG/DL
LYMPHOCYTES # BLD AUTO: 2.5 10E9/L (ref 0.8–5.3)
LYMPHOCYTES NFR BLD AUTO: 32.4 %
MCH RBC QN AUTO: 26.8 PG (ref 26.5–33)
MCHC RBC AUTO-ENTMCNC: 31.5 G/DL (ref 31.5–36.5)
MCV RBC AUTO: 85 FL (ref 78–100)
MONOCYTES # BLD AUTO: 0.4 10E9/L (ref 0–1.3)
MONOCYTES NFR BLD AUTO: 5.2 %
NEUTROPHILS # BLD AUTO: 4.7 10E9/L (ref 1.6–8.3)
NEUTROPHILS NFR BLD AUTO: 61.2 %
NONHDLC SERPL-MCNC: 95 MG/DL
NRBC # BLD AUTO: 0 10*3/UL
NRBC BLD AUTO-RTO: 0 /100
PLATELET # BLD AUTO: 215 10E9/L (ref 150–450)
POTASSIUM SERPL-SCNC: 3.8 MMOL/L (ref 3.4–5.3)
PROT SERPL-MCNC: 7.5 G/DL (ref 6.8–8.8)
RBC # BLD AUTO: 4.59 10E12/L (ref 3.8–5.2)
SODIUM SERPL-SCNC: 138 MMOL/L (ref 133–144)
TRIGL SERPL-MCNC: 86 MG/DL
WBC # BLD AUTO: 7.6 10E9/L (ref 4–11)

## 2019-10-24 ASSESSMENT — PAIN SCALES - GENERAL: PAINLEVEL: MILD PAIN (2)

## 2019-10-24 NOTE — PATIENT INSTRUCTIONS
Labs today.  Return in 1 month for repeat pregnancy test and then can start accutane.    Side effects of Accutane: lip and skin dryness, joint aches, muscle aches, depression, suicidal ideation, headache, blurred vision, GI upset, increase in liver function tests, increase in lipids, and birth defects if you were to get pregnant.

## 2019-10-24 NOTE — NURSING NOTE
Chief Complaint   Patient presents with     Acne     Patient is here today for acne.      Michelle Kumar CMA

## 2019-10-24 NOTE — PROGRESS NOTES
[facility] Dermatology Note      Dermatology Problem List:  1. Acne vulgaris, moderate, inflammatory  - initiated Accutane process 10/24/19   - Previous Tx: doxycycline, Differin, BP wash, clindamycin, tretinoin, minocycline, proactive  - regular menses, currently on OCPs    Encounter Date: Oct 24, 2019    CC:  Chief Complaint   Patient presents with     Acne     Patient is here today for acne.          History of Present Illness:  Ms. Laurie Morales is a 18 year old female who presents today for evaluation of acne. The patient was last seen by Dr. Clifford on 8/16/19 when starting Accutane was discussed. Today she reports she was unable to follow up on her acne due to moving away from campus. Now she is living close to the [facility] again which makes treatment easier. She has been struggling with increased breakouts lately. Doesn't think acne is correlated with her menstrual cycle. She is not using any prescription topicals. Currently uses Cetaphil face wash and moisturizer. She has tried many topicals in the past including tretinoin, clindamycin, differin. Benzoyl peroxide was too harsh for her skin. Also tried minocycline and doxycyline and prefers not to get on an antibiotic again. She is on an oral contraceptive right now. Expresses interest in starting Accutane today. The patient is otherwise feeling well. There are no other skin concerns at this time.      Past Medical History:   Patient Active Problem List   Diagnosis     Mild intermittent asthma     Impetigo     Goiter     Acne     Past Medical History:   Diagnosis Date     Nonsuppurative otitis media, not specified as acute or chronic 09/17/1999    Recurrent otitis media     Unspecified disease of respiratory system 09/17/1999    RAD     Past Surgical History:   Procedure Laterality Date     HC CREATE EARDRUM OPENING,GEN ANESTH      P.E. [id number]       Social History:  Patient reports that she has never smoked. She has never used smokeless  tobacco. She reports that she does not drink alcohol or use drugs.    Family History:  Family History   Problem Relation Age of Onset     Cancer Paternal Grandfather         prostate     Heart Disease Maternal Grandfather         CAB age 63     Cerebrovascular Disease Maternal Grandfather      Anesthesia Reaction No family hx of        Medications:  Current Outpatient Medications   Medication Sig Dispense Refill     TRI-LO-ESTARYLLA 0.18/0.215/0.25 MG-25 MCG per tablet TAKE ONE TABLET BY MOUTH EVERY DAY 84 tablet 1     VENTOLIN  (90 BASE) MCG/ACT Inhaler INHALE TWO PUFFS BY MOUTH EVERY 6 HOURS AS NEEDED FOR SHORTNESS OF BREATH 18 g 0     clindamycin (CLEOCIN T) 1 % solution Apply a thin layer to the face once daily in the am (Patient not taking: Reported on 10/24/2019) 60 mL 11     terconazole (TERAZOL 7) 0.4 % cream Place 1 applicatorful in vagina qhs x 7 days (Patient not taking: Reported on 10/24/2019) 45 g 0       Allergies   Allergen Reactions     No Known Allergies        Review of Systems:  -Skin Establ Pt: The patient denies any new rash, pruritus, or lesions that are symptomatic, changing or bleeding, except as per HPI.  -Constitutional: The patient is feeling generally well.    Physical exam:  GEN: This is a well developed, well-nourished female in no acute distress, in a pleasant mood.    SKIN: Acne exam, which includes the face, neck, upper central chest, and upper central back was performed.  - Superifical acneiform papules with intermixed open and closed comedones on the face, upper chest, and back. Most prominent on the forehead and lower chin.    - Many hyperpigmented macules scattered on the back.   - No other lesions of concern on areas examined.     Impression/Plan:  1. Acne vulgaris, predominantly inflammatory. Discussed options including resuming topicals with temporary antibiotic use but patient has essentially done this and failed. Given prior failure, discussed accutane which patient  is interested in pursuing.  Will start isotretinoin 40 mg next month. Goal dose is 120-150mg/kg. Will get weight next month.   Method of contraception includes: OCP and condoms.  Discussion of the risks and side effects of isotretinoin including but not limited to mucocutaneous dryness, arthralgias, myalgias, depression, suicidal ideation, headache, blurred vision, increase in liver function test and increase in lipids. The iPledge program brochure was provided and the contents discussed with the patient. The patient was counseled that they cannot give blood while on isotretinoin. Advised against tattoos and waxing. No personal or family history of inflammatory bowel disease or hypertriglyceridemia known to patient. Reviewed need to avoid alcohol on medication. The iPledge program consent was obtained. Patient counseled that if they wear contacts, the eyes may become too dry to tolerate. Recommend follow up with eye doctor if this occurs.    Discussed need for sun protection, at least SPF 30+.  Baseline labs today -hCG, CBC, CMP, fasting lipids.  The patient will stop all other acne medications next month.  Total dose: 0mg/kg    Follow-up in 1 month.       Staff Involved:    Scribe Disclosure  I, Ishan Walker, am serving as a scribe to document services personally performed by Dr. Kenzie Reece, based on data collection and the provider's statements to me.     Provider Disclosure:   The documentation recorded by the scribe accurately reflects the services I personally performed and the decisions made by me.    Kenzie Reece MD    Department of Dermatology  [facility]: Phone: [phone], Fax: [phone]

## 2019-10-24 NOTE — LETTER
10/24/2019       RE: Laurie Morales  [address] [place] [address]     Dear Colleague,    Thank you for referring your patient, Laurie Morales, to the [facility] DERMATOLOGY at [facility]. Please see a copy of my visit note below.    [facility] Dermatology Note      Dermatology Problem List:  1. Acne vulgaris, moderate, inflammatory  - initiated Accutane process 10/24/19   - Previous Tx: doxycycline, Differin, BP wash, clindamycin, tretinoin, minocycline, proactive  - regular menses, currently on OCPs    Encounter Date: Oct 24, 2019    CC:  Chief Complaint   Patient presents with     Acne     Patient is here today for acne.          History of Present Illness:  Ms. Laurie Morales is a 18 year old female who presents today for evaluation of acne. The patient was last seen by Dr. Clifford on 8/16/19 when starting Accutane was discussed. Today she reports she was unable to follow up on her acne due to moving away from campus. Now she is living close to the [facility] which makes treatment easier. She has been struggling with increased breakouts lately. Doesn't think acne is correlated with her menstrual cycle. She is not using any prescription topicals. Currently uses Cetaphil face wash and moisturizer. She has tried many topicals in the past including tretinoin, clindamycin, differin. Benzoyl peroxide was too harsh for her skin. Also tried minocycline and doxycyline and prefers not to get on an antibiotic again. She is on an oral contraceptive right now. Expresses interest in starting Accutane today. The patient is otherwise feeling well. There are no other skin concerns at this time.      Past Medical History:   Patient Active Problem List   Diagnosis     Mild intermittent asthma     Impetigo     Goiter     Acne     Past Medical History:   Diagnosis Date     Nonsuppurative otitis media, not specified as acute or chronic 09/17/1999    Recurrent otitis  media     Unspecified disease of respiratory system 09/17/1999    RAD     Past Surgical History:   Procedure Laterality Date     HC CREATE EARDRUM OPENING,GEN ANESTH      P.E. [id number]       Social History:  Patient reports that she has never smoked. She has never used smokeless tobacco. She reports that she does not drink alcohol or use drugs.    Family History:  Family History   Problem Relation Age of Onset     Cancer Paternal Grandfather         prostate     Heart Disease Maternal Grandfather         CAB age 63     Cerebrovascular Disease Maternal Grandfather      Anesthesia Reaction No family hx of        Medications:  Current Outpatient Medications   Medication Sig Dispense Refill     TRI-LO-ESTARYLLA 0.18/0.215/0.25 MG-25 MCG per tablet TAKE ONE TABLET BY MOUTH EVERY DAY 84 tablet 1     VENTOLIN  (90 BASE) MCG/ACT Inhaler INHALE TWO PUFFS BY MOUTH EVERY 6 HOURS AS NEEDED FOR SHORTNESS OF BREATH 18 g 0     clindamycin (CLEOCIN T) 1 % solution Apply a thin layer to the face once daily in the am (Patient not taking: Reported on 10/24/2019) 60 mL 11     terconazole (TERAZOL 7) 0.4 % cream Place 1 applicatorful in vagina qhs x 7 days (Patient not taking: Reported on 10/24/2019) 45 g 0       Allergies   Allergen Reactions     No Known Allergies        Review of Systems:  -Skin Establ Pt: The patient denies any new rash, pruritus, or lesions that are symptomatic, changing or bleeding, except as per HPI.  -Constitutional: The patient is feeling generally well.    Physical exam:  GEN: This is a well developed, well-nourished female in no acute distress, in a pleasant mood.    SKIN: Acne exam, which includes the face, neck, upper central chest, and upper central back was performed.  - Superifical acneiform papules with intermixed open and closed comedones on the face, upper chest, and back. Most prominent on the forehead and lower chin.    - Many hyperpigmented macules scattered on the back.   - No other  lesions of concern on areas examined.     Impression/Plan:  1. Acne vulgaris, predominantly inflammatory. Discussed options including resuming topicals with temporary antibiotic use but patient has essentially done this and failed. Given prior failure, discussed accutane which patient is interested in pursuing.  Will start isotretinoin 40 mg next month. Goal dose is 120-150mg/kg. Will get weight next month.   Method of contraception includes: OCP and condoms.  Discussion of the risks and side effects of isotretinoin including but not limited to mucocutaneous dryness, arthralgias, myalgias, depression, suicidal ideation, headache, blurred vision, increase in liver function test and increase in lipids. The iPledge program brochure was provided and the contents discussed with the patient. The patient was counseled that they cannot give blood while on isotretinoin. Advised against tattoos and waxing. No personal or family history of inflammatory bowel disease or hypertriglyceridemia known to patient. Reviewed need to avoid alcohol on medication. The iPledge program consent was obtained. Patient counseled that if they wear contacts, the eyes may become too dry to tolerate. Recommend follow up with eye doctor if this occurs.    Discussed need for sun protection, at least SPF 30+.  Baseline labs today -hCG, CBC, CMP, fasting lipids.  The patient will stop all other acne medications next month.  Total dose: 0mg/kg    Follow-up in 1 month.       Staff Involved:    Scribe Disclosure  I, Ishan Walker, am serving as a scribe to document services personally performed by Dr. Kenzie Reece, based on data collection and the provider's statements to me.     Provider Disclosure:   The documentation recorded by the scribe accurately reflects the services I personally performed and the decisions made by me.    Kenzie Reece MD    Department of Dermatology  [facility]  [facility]: Phone: [phone], Fax: [phone]      Accutane Intake:  Ipledge number:[id number]  Copy of consent retained for medical record:Yes  Baseline and future lab orders placed:Yes  Baseline weight obtained:Yes  Patient registered:Yes

## 2019-10-25 NOTE — PROGRESS NOTES
Accutane Intake:  Ipledge number:[id number]  Copy of consent retained for medical record:Yes  Baseline and future lab orders placed:Yes  Baseline weight obtained:Yes  Patient registered:Yes

## 2019-10-26 ENCOUNTER — HEALTH MAINTENANCE LETTER (OUTPATIENT)
Age: 19
End: 2019-10-26

## 2019-11-26 ENCOUNTER — OFFICE VISIT (OUTPATIENT)
Dept: DERMATOLOGY | Facility: CLINIC | Age: 19
End: 2019-11-26
Payer: COMMERCIAL

## 2019-11-26 DIAGNOSIS — Z79.899 ENCOUNTER FOR LONG-TERM (CURRENT) USE OF HIGH-RISK MEDICATION: ICD-10-CM

## 2019-11-26 DIAGNOSIS — L70.0 ACNE VULGARIS: Primary | ICD-10-CM

## 2019-11-26 LAB — HCG UR QL: NEGATIVE

## 2019-11-26 RX ORDER — ISOTRETINOIN 30 MG/1
CAPSULE ORAL
Qty: 30 CAPSULE | Refills: 0 | Status: SHIPPED | OUTPATIENT
Start: 2019-11-26 | End: 2020-08-27

## 2019-11-26 NOTE — NURSING NOTE
Dermatology Rooming Note    Laurie Morales's goals for this visit include:   Chief Complaint   Patient presents with     Derm Problem     Laurie is here to start accutane.      Scarlett Palacios LPN

## 2019-11-26 NOTE — PROGRESS NOTES
Accutane Intake:  Ipledge number:[id number]  Copy of consent retained for medical record:Yes  Baseline and future lab orders placed:Yes  Baseline weight obtained:Yes  Patient registered:Yes            [facility] Dermatology Note        Dermatology Problem List:  1. Acne vulgaris, moderate, inflammatory  - initiated Accutane process 10/24/19   -starting isotretinoin 30mg daily 11/26/19  - Previous Tx: doxycycline, Differin, BP wash, clindamycin, tretinoin, minocycline, proactive  - regular menses, currently on OCPs     Encounter Date: Nov 26, 2019     CC:  Follow up on acne, starting isotretinoin     History of Present Illness:  Ms. Laurie Morales is a 19 year old female who presents today for followup of acne. The patient was last seen by Dr. Reece on 10/24/19 when starting Accutane was discussed, and screening baseline labs were obtained.   Now she is living close to the [facility] again which makes treatment easier. She has been struggling with increased breakouts lately. Doesn't think acne is correlated with her menstrual cycle. She is not using any prescription topicals. Currently uses Cetaphil face wash and moisturizer. She has tried many topicals in the past including tretinoin, clindamycin, differin. Benzoyl peroxide was too harsh for her skin. Also tried minocycline and doxycyline and prefers not to get on an antibiotic again. She is on an oral contraceptive right now. Expresses interest in starting Accutane today. The patient is otherwise feeling well. There are no other skin concerns at this time.   Acne stable vs last visit, involves face and chest, but not back.     Past Medical History:       Patient Active Problem List   Diagnosis     Mild intermittent asthma     Impetigo     Goiter     Acne      Past Medical History        Past Medical History:   Diagnosis Date     Nonsuppurative otitis media, not specified as acute or chronic 09/17/1999     Recurrent otitis media     Unspecified  disease of respiratory system 09/17/1999     RAD         Past Surgical History         Past Surgical History:   Procedure Laterality Date     HC CREATE EARDRUM OPENING,GEN ANESTH         P.E. [id number]            Social History:  Patient reports that she has never smoked. She has never used smokeless tobacco. She reports that she does not drink alcohol or use drugs.       Review of Systems:  -Skin Establ Pt: The patient denies any new rash, pruritus, or lesions that are symptomatic, changing or bleeding, except as per HPI.  -Constitutional: The patient is feeling generally well.  10 point ROS otherwise negative     Physical exam:  GEN: This is a well developed, well-nourished female in no acute distress, in a pleasant mood.    SKIN: Acne exam, which includes the face, neck, upper central chest, and upper central back was performed.  - Superifical acneiform papules with intermixed open and closed comedones on the face, upper chest, and back. Most prominent on the forehead and lower chin.    - Many hyperpigmented macules scattered on the back.   - No other lesions of concern on areas examined.      Impression/Plan:  1. Acne vulgaris, predominantly inflammatory. Patient to start isotretinoin today.     Will start isotretinoin 30 mg daily. Goal dose is 220mg/kg. Weighs 120 lb (54.5kg)     Method of contraception includes: OCP and condoms.    UPT today then monthly    Discussion of the risks and side effects of isotretinoin including but not limited to mucocutaneous dryness, arthralgias, myalgias, depression, suicidal ideation, headache, blurred vision, increase in liver function test and increase in lipids. The iPledge program brochure was provided and the contents discussed with the patient. The patient was counseled that they cannot give blood while on isotretinoin. Advised against tattoos and waxing. No personal or family history of inflammatory bowel disease or hypertriglyceridemia known to patient. Reviewed need  to avoid alcohol on medication. The iPledge program consent was obtained. Patient counseled that if they wear contacts, the eyes may become too dry to tolerate. Recommend follow up with eye doctor if this occurs.      Discussed need for sun protection, at least SPF 30+.    Baseline labs at last visit -hCG, CBC, CMP, fasting lipids - all WNL    Stop all other acne medications.    Total dose: 0mg/kg     Follow-up in 1 month.           Epi Lott MD  Dermatology Attending

## 2019-11-26 NOTE — LETTER
11/26/2019       RE: Laurie Morales  [address] [place] [address]     Dear Colleague,    Thank you for referring your patient, Laurie Morales, to the [facility] DERMATOLOGY at [facility]. Please see a copy of my visit note below.    Accutane Intake:  Ipledge number:[id number]  Copy of consent retained for medical record:Yes  Baseline and future lab orders placed:Yes  Baseline weight obtained:Yes  Patient registered:Yes            [facility] Dermatology Note        Dermatology Problem List:  1. Acne vulgaris, moderate, inflammatory  - initiated Accutane process 10/24/19   -starting isotretinoin 30mg daily 11/26/19  - Previous Tx: doxycycline, Differin, BP wash, clindamycin, tretinoin, minocycline, proactive  - regular menses, currently on OCPs     Encounter Date:  Nov 26, 2019     CC:  Follow up on acne, starting isotretinoin     History of Present Illness:  Ms. Laurie Morales is a [age] year old female who presents today for followup of acne. The patient was last seen by Dr. Reece on 10/24/19 when starting Accutane was discussed, and screening baseline labs were obtained.   Now she is living close to the [facility] again which makes treatment easier. She has been struggling with increased breakouts lately. Doesn't think acne is correlated with her menstrual cycle. She is not using any prescription topicals. Currently uses Cetaphil face wash and moisturizer. She has tried many topicals in the past including tretinoin, clindamycin, differin. Benzoyl peroxide was too harsh for her skin. Also tried minocycline and doxycyline and prefers not to get on an antibiotic again. She is on an oral contraceptive right now. Expresses interest in starting Accutane today. The patient is otherwise feeling well. There are no other skin concerns at this time.   Acne stable vs last visit, involves face and chest, but not back.     Past Medical History:       Patient Active  Problem List   Diagnosis     Mild intermittent asthma     Impetigo     Goiter     Acne      Past Medical History        Past Medical History:   Diagnosis Date     Nonsuppurative otitis media, not specified as acute or chronic 09/17/1999     Recurrent otitis media     Unspecified disease of respiratory system 09/17/1999     RAD         Past Surgical History         Past Surgical History:   Procedure Laterality Date     HC CREATE EARDRUM OPENING,GEN ANESTH         P.E. [id number]            Social History:  Patient reports that she has never smoked. She has never used smokeless tobacco. She reports that she does not drink alcohol or use drugs.       Review of Systems:  -Skin Establ Pt: The patient denies any new rash, pruritus, or lesions that are symptomatic, changing or bleeding, except as per HPI.  -Constitutional: The patient is feeling generally well.  10 point ROS otherwise negative     Physical exam:  GEN: This is a well developed, well-nourished female in no acute distress, in a pleasant mood.    SKIN: Acne exam, which includes the face, neck, upper central chest, and upper central back was performed.  - Superifical acneiform papules with intermixed open and closed comedones on the face, upper chest, and back. Most prominent on the forehead and lower chin.    - Many hyperpigmented macules scattered on the back.   - No other lesions of concern on areas examined.      Impression/Plan:  1. Acne vulgaris, predominantly inflammatory. Patient to start isotretinoin today.     Will start isotretinoin  30 mg  daily. Goal dose is 220mg/kg. Weighs 120 lb (54.5kg)     Method of contraception includes: OCP and condoms.    UPT today then monthly    Discussion of the risks and side effects of isotretinoin including but not limited to mucocutaneous dryness, arthralgias, myalgias, depression, suicidal ideation, headache, blurred vision, increase in liver function test and increase in lipids. The iPledge program brochure was  provided and the contents discussed with the patient. The patient was counseled that they cannot give blood while on isotretinoin. Advised against tattoos and waxing. No personal or family history of inflammatory bowel disease or hypertriglyceridemia known to patient. Reviewed need to avoid alcohol on medication. The iPledge program consent was obtained. Patient counseled that if they wear contacts, the eyes may become too dry to tolerate. Recommend follow up with eye doctor if this occurs.      Discussed need for sun protection, at least SPF 30+.    Baseline labs at last visit -hCG, CBC, CMP, fasting lipids - all WNL    Stop all other acne medications.    Total dose: 0mg/kg     Follow-up in 1 month.           Epi Lott MD  Dermatology Attending

## 2019-12-31 ENCOUNTER — OFFICE VISIT (OUTPATIENT)
Dept: DERMATOLOGY | Facility: CLINIC | Age: 19
End: 2019-12-31
Payer: COMMERCIAL

## 2019-12-31 DIAGNOSIS — Z79.899 ENCOUNTER FOR LONG-TERM CURRENT USE OF HIGH RISK MEDICATION: ICD-10-CM

## 2019-12-31 DIAGNOSIS — L70.0 ACNE VULGARIS: Primary | ICD-10-CM

## 2019-12-31 DIAGNOSIS — Z79.899 ENCOUNTER FOR LONG-TERM (CURRENT) USE OF HIGH-RISK MEDICATION: ICD-10-CM

## 2019-12-31 LAB — HCG UR QL: NEGATIVE

## 2019-12-31 RX ORDER — ISOTRETINOIN 40 MG/1
40 CAPSULE ORAL DAILY
Qty: 30 CAPSULE | Refills: 0 | Status: SHIPPED | OUTPATIENT
Start: 2019-12-31 | End: 2020-08-27

## 2019-12-31 ASSESSMENT — PAIN SCALES - GENERAL: PAINLEVEL: NO PAIN (0)

## 2019-12-31 NOTE — PROGRESS NOTES
Accutane Intake:  Ipledge number:[id number]  Copy of consent retained for medical record:Yes  Baseline and future lab orders placed:Yes  Baseline weight obtained:Yes  Patient registered:Yes               [facility] Dermatology Note        Dermatology Problem List:  1. Acne vulgaris, moderate, inflammatory  - initiated Accutane process 10/24/19   -started isotretinoin 30mg daily 11/26/19  - Previous Tx: doxycycline, Differin, BP wash, clindamycin, tretinoin, minocycline, proactive  - regular menses, currently on OCPs     Encounter Date: Dec 31, 2019     CC:  Follow up on acne, started isotretinoin 11/26     History of Present Illness:  Ms. Laurie Morales is a 19 year old female who presents today for followup of acne. The patient was last seen11/26/19 when starting Accutane was started at 30mg daily.     Is tolerating medication well so far.  Mild dryness of skin and lips but tolerable.  Denies significant eye dryness.  Overall noticing modest improvement of acne on face/chin over the past 1-2 weeks; pleased with improvement thusfar.  No new areas of involvement, no major flareups since starting.       Social History:  Patient reports that she has never smoked. She has never used smokeless tobacco. She reports that she does not drink alcohol or use drugs.        Review of Systems:  -Skin Establ Pt: The patient denies any new rash, pruritus, or lesions that are symptomatic, changing or bleeding, except as per HPI.  -Constitutional: The patient is feeling generally well.  10 point ROS otherwise negative     Physical exam:  GEN: This is a well developed, well-nourished female in no acute distress, in a pleasant mood.    SKIN: Acne exam, which includes the face, neck, bilateral upper extremities  - Superifical acneiform papules with intermixed open and closed comedones on the face, upper chest, and back. Most prominent on the forehead and lower chin.    - No other lesions of concern on areas examined.       Impression/Plan:  1. Acne vulgaris, predominantly inflammatory. Patient started isotretinoin one month ago, tolerating well without significant side effects.     Increase isotretinoin to 40 mg daily. Goal dose is 220mg/kg. Weighs 120 lb (54.5kg)     Method of contraception includes: OCP and condoms.    UPT today was negative; continue monthly    Discussion of the risks and side effects of isotretinoin including but not limited to mucocutaneous dryness, arthralgias, myalgias, depression, suicidal ideation, headache, blurred vision, increase in liver function test and increase in lipids. The iPledge program brochure was provided and the contents discussed with the patient. The patient was counseled that they cannot give blood while on isotretinoin. Advised against tattoos and waxing. No personal or family history of inflammatory bowel disease or hypertriglyceridemia known to patient. Reviewed need to avoid alcohol on medication. The iPledge program consent was obtained. Patient counseled that if they wear contacts, the eyes may become too dry to tolerate. Recommend follow up with eye doctor if this occurs.      Discussed need for sun protection, at least SPF 30+.    Baseline labs at last visit -hCG, CBC, CMP, fasting lipids - all WNL    Repeat LFTs and lipids after 2-3 months of therapy    Total dose to date: 900mg/kg     Follow-up in 1 month.             Epi Lott MD  Dermatology Attending

## 2019-12-31 NOTE — PATIENT INSTRUCTIONS
Gentlest face wash:  Purpose     If skin is really dry, use a facial moisturizer such as:  Cetaphil or Neutrogena

## 2019-12-31 NOTE — LETTER
12/31/2019    RE: Laurie Morales  [address] [place] [address]     Dear Colleague,    Thank you for referring your patient, Laurie oMrales, to the [facility] DERMATOLOGY at [facility]. Please see a copy of my visit note below.    Accutane Intake:  Ipledge number:[id number]  Copy of consent retained for medical record:Yes  Baseline and future lab orders placed:Yes  Baseline weight obtained:Yes  Patient registered:Yes               [facility] Dermatology Note        Dermatology Problem List:  1. Acne vulgaris, moderate, inflammatory  - initiated Accutane process 10/24/19   -started isotretinoin 30mg daily 11/26/19  - Previous Tx: doxycycline, Differin, BP wash, clindamycin, tretinoin, minocycline, proactive  - regular menses, currently on OCPs     Encounter Date: Dec 31, 2019     CC:  Follow up on acne, started isotretinoin 11/26     History of Present Illness:  Ms. Laurie Morales is a 19 year old female who presents today for followup of acne. The patient was last seen11/26/19 when starting Accutane was started at 30mg daily.     Is tolerating medication well so far.  Mild dryness of skin and lips but tolerable.  Denies significant eye dryness.  Overall noticing modest improvement of acne on face/chin over the past 1-2 weeks; pleased with improvement thusfar.  No new areas of involvement, no major flareups since starting.       Social History:  Patient reports that she has never smoked. She has never used smokeless tobacco. She reports that she does not drink alcohol or use drugs.        Review of Systems:  -Skin Establ Pt: The patient denies any new rash, pruritus, or lesions that are symptomatic, changing or bleeding, except as per HPI.  -Constitutional: The patient is feeling generally well.  10 point ROS otherwise negative     Physical exam:  GEN: This is a well developed, well-nourished female in no acute distress, in a pleasant  mood.    SKIN: Acne exam, which includes the face, neck, bilateral upper extremities  - Superifical acneiform papules with intermixed open and closed comedones on the face, upper chest, and back. Most prominent on the forehead and lower chin.    - No other lesions of concern on areas examined.      Impression/Plan:  1. Acne vulgaris, predominantly inflammatory. Patient  started isotretinoin one month ago, tolerating well without significant side effects.     Increase isotretinoin to 40 mg daily. Goal dose is 220mg/kg. Weighs 120 lb (54.5kg)     Method of contraception includes: OCP and condoms.    UPT today was negative; continue monthly    Discussion of the risks and side effects of isotretinoin including but not limited to mucocutaneous dryness, arthralgias, myalgias, depression, suicidal ideation, headache, blurred vision, increase in liver function test and increase in lipids. The iPledge program brochure was provided and the contents discussed with the patient. The patient was counseled that they cannot give blood while on isotretinoin. Advised against tattoos and waxing. No personal or family history of inflammatory bowel disease or hypertriglyceridemia known to patient. Reviewed need to avoid alcohol on medication. The iPledge program consent was obtained. Patient counseled that if they wear contacts, the eyes may become too dry to tolerate. Recommend follow up with eye doctor if this occurs.      Discussed need for sun protection, at least SPF 30+.    Baseline labs at last visit -hCG, CBC, CMP, fasting lipids - all WNL    Repeat LFTs and lipids after 2-3 months of therapy    Total dose to date: 900mg/kg     Follow-up in 1 month.          Epi Lott MD  Dermatology Attending

## 2019-12-31 NOTE — NURSING NOTE
Dermatology Rooming Note    Laurie Morales's goals for this visit include:   Chief Complaint   Patient presents with     Accutane     Laurie is here today for an accutane follow up.      ANOOP Cheatham

## 2020-01-02 DIAGNOSIS — L70.0 ACNE VULGARIS: ICD-10-CM

## 2020-01-06 RX ORDER — ISOTRETINOIN 30 MG/1
CAPSULE ORAL
Qty: 30 CAPSULE | Refills: 0 | OUTPATIENT
Start: 2020-01-06

## 2020-01-29 DIAGNOSIS — Z79.899 ENCOUNTER FOR LONG-TERM (CURRENT) USE OF HIGH-RISK MEDICATION: ICD-10-CM

## 2020-01-29 LAB — HCG UR QL: NEGATIVE

## 2020-01-30 ENCOUNTER — OFFICE VISIT (OUTPATIENT)
Dept: DERMATOLOGY | Facility: CLINIC | Age: 20
End: 2020-01-30
Payer: COMMERCIAL

## 2020-01-30 DIAGNOSIS — Z79.899 ENCOUNTER FOR LONG-TERM (CURRENT) USE OF HIGH-RISK MEDICATION: Primary | ICD-10-CM

## 2020-01-30 DIAGNOSIS — L70.0 ACNE VULGARIS: ICD-10-CM

## 2020-01-30 RX ORDER — ISOTRETINOIN 30 MG/1
60 CAPSULE ORAL DAILY
Qty: 60 CAPSULE | Refills: 0 | Status: SHIPPED | OUTPATIENT
Start: 2020-01-30 | End: 2020-03-05

## 2020-01-30 ASSESSMENT — PAIN SCALES - GENERAL: PAINLEVEL: NO PAIN (0)

## 2020-01-30 NOTE — NURSING NOTE
Dermatology Rooming Note    Laurie Morales's goals for this visit include:   Chief Complaint   Patient presents with     Derm Problem     Accutane follow up, Laurie does not think her acne has improved.      Scarlett Palacios LPN

## 2020-01-30 NOTE — PROGRESS NOTES
Accutane Intake:  Ipledge number:[id number]  Copy of consent retained for medical record:Yes  Baseline and future lab orders placed:Yes  Baseline weight obtained:Yes  Patient registered:Yes               [facility] Dermatology Note        Dermatology Problem List:  1. Acne vulgaris, moderate, inflammatory  - initiated Accutane process 10/24/19   -started isotretinoin 30mg daily 11/26/19  - Previous Tx: doxycycline, Differin, BP wash, clindamycin, tretinoin, minocycline, proactive  - regular menses, currently on OCPs     Encounter Date: Jan 30, 2020     CC:  Follow up on acne, started isotretinoin 11/26     History of Present Illness:  Ms. Laurie Morales is a 19 year old female who presents today for followup of acne. The patient was last seen 12/31/19 when we increased her Accutane from 30mg daily to 40mg daily (now s/p 30mg x30d + 40mg x 30d = 2100mg total dose).      Is tolerating medication well.  Mild dryness of skin and lips but tolerable.  Denies significant eye dryness.  Overall noticing modest improvement of acne on face/chin over the past 1-2 weeks, but with ongoing bumps on forehead.  No new areas of involvement today.        Social History:  Patient reports that she has never smoked. She has never used smokeless tobacco. She reports that she does not drink alcohol or use drugs.        Review of Systems:  -Skin Establ Pt: The patient denies any new rash, pruritus, or lesions that are symptomatic, changing or bleeding, except as per HPI.  -Constitutional: The patient is feeling generally well.  10 point ROS otherwise negative     Physical exam:  GEN: This is a well developed, well-nourished female in no acute distress, in a pleasant mood.    SKIN: Acne exam, which includes the face, neck, bilateral upper extremities  - Superifical acneiform papules with intermixed open and closed comedones on the forehead, clustered on upper left forehead.    - No other lesions of concern on areas  examined.      Impression/Plan:  1. Acne vulgaris, predominantly inflammatory. Patient started isotretinoin two months ago, tolerating well without significant side effects.     Increase isotretinoin to 60 mg daily. Goal dose is 220mg/kg. Weighs 120 lb (54.5kg); total dose to date is 2100mg (goal dose 12,000mg)    Method of contraception includes: OCP and condoms.    UPT today was negative; continue monthly    Discussion of the risks and side effects of isotretinoin including but not limited to mucocutaneous dryness, arthralgias, myalgias, depression, suicidal ideation, headache, blurred vision, increase in liver function test and increase in lipids. The iPledge program brochure was provided and the contents discussed with the patient. The patient was counseled that they cannot give blood while on isotretinoin. Advised against tattoos and waxing. No personal or family history of inflammatory bowel disease or hypertriglyceridemia known to patient. Reviewed need to avoid alcohol on medication. The iPledge program consent was obtained. Patient counseled that if they wear contacts, the eyes may become too dry to tolerate. Recommend follow up with eye doctor if this occurs.      Discussed need for sun protection, at least SPF 30+.    Baseline labs at last visit -hCG, CBC, CMP, fasting lipids - all WNL    Repeat LFTs and lipids at next visit    Total dose to date: 2100mg/kg     Follow-up in 1 month.             Epi Lott MD  Dermatology Attending

## 2020-03-04 DIAGNOSIS — L70.0 ACNE VULGARIS: ICD-10-CM

## 2020-03-04 DIAGNOSIS — Z79.899 ENCOUNTER FOR LONG-TERM (CURRENT) USE OF HIGH-RISK MEDICATION: ICD-10-CM

## 2020-03-04 DIAGNOSIS — Z51.81 MEDICATION MONITORING ENCOUNTER: ICD-10-CM

## 2020-03-04 LAB
ALBUMIN SERPL-MCNC: 3.5 G/DL (ref 3.4–5)
ALP SERPL-CCNC: 51 U/L (ref 40–150)
ALT SERPL W P-5'-P-CCNC: 17 U/L (ref 0–50)
AST SERPL W P-5'-P-CCNC: 9 U/L (ref 0–45)
BILIRUB DIRECT SERPL-MCNC: <0.1 MG/DL (ref 0–0.2)
BILIRUB SERPL-MCNC: 0.2 MG/DL (ref 0.2–1.3)
CHOLEST SERPL-MCNC: 176 MG/DL
HCG UR QL: NEGATIVE
HDLC SERPL-MCNC: 64 MG/DL
LDLC SERPL CALC-MCNC: 92 MG/DL
NONHDLC SERPL-MCNC: 111 MG/DL
PROT SERPL-MCNC: 6.9 G/DL (ref 6.8–8.8)
TRIGL SERPL-MCNC: 98 MG/DL

## 2020-03-05 ENCOUNTER — OFFICE VISIT (OUTPATIENT)
Dept: DERMATOLOGY | Facility: CLINIC | Age: 20
End: 2020-03-05
Payer: COMMERCIAL

## 2020-03-05 DIAGNOSIS — Z79.899 ENCOUNTER FOR LONG-TERM CURRENT USE OF HIGH RISK MEDICATION: Primary | ICD-10-CM

## 2020-03-05 DIAGNOSIS — L70.0 ACNE VULGARIS: ICD-10-CM

## 2020-03-05 RX ORDER — ISOTRETINOIN 30 MG/1
60 CAPSULE ORAL DAILY
Qty: 60 CAPSULE | Refills: 0 | Status: SHIPPED | OUTPATIENT
Start: 2020-03-05 | End: 2020-04-09

## 2020-03-05 NOTE — LETTER
3/5/2020       RE: Laurie Morales  [address] [place] [place] [facility] [address]     Dear Colleague,    Thank you for referring your patient, Laurie Morales, to the [facility] DERMATOLOGY at [facility]. Please see a copy of my visit note below.    Accutane Intake:  Ipledge number:[id number]  Copy of consent retained for medical record:Yes  Baseline and future lab orders placed:Yes  Baseline weight obtained:Yes  Patient registered:Yes               [facility] Dermatology Note        Dermatology Problem List:  1. Acne vulgaris, moderate, inflammatory  - initiated Accutane process 10/24/19   -started isotretinoin 30mg daily 11/26/19  - Previous Tx: doxycycline, Differin, BP wash, clindamycin, tretinoin, minocycline, proactive  - regular menses, currently on OCPs     Encounter Date: March 5, 2020     CC:  Follow up on acne, started isotretinoin 11/26     History of Present Illness:  Ms. Laurie Morales is a 19 year old female who presents today for followup of acne. The patient was last seen 1/30/19 when we increased her Accutane from 40mg daily to 60mg daily (now s/p 30mg x30d + 40mg x 30d + 60mg x 30 d = 3900mg total dose).      Is tolerating medication well.  Mild dryness of skin and lips but tolerable.  Denies significant eye dryness.  Overall noticing improvement of overall face oiliness, but has had occasional acne breakouts on face/chin over the past 1-2 weeks, and with ongoing bumps on forehead.  No new areas of involvement today.        Social History:  Patient reports that she has never smoked. She has never used smokeless tobacco. She reports that she does not drink alcohol or use drugs.        Review of Systems:  -Skin Establ Pt: The patient denies any new rash, pruritus, or lesions that are symptomatic, changing or bleeding, except as per HPI.  -Constitutional: The patient is feeling generally well.  10 point ROS otherwise negative     Physical  exam:  GEN: This is a well developed, well-nourished female in no acute distress, in a pleasant mood.    SKIN: Acne exam, which includes the face, neck, bilateral upper extremities  - Superifical acneiform papules with intermixed open and closed comedones on the forehead, clustered on upper left forehead.    - No other lesions of concern on areas examined.      Impression/Plan:  1. Acne vulgaris, predominantly inflammatory. Patient started isotretinoin three months ago, tolerating well without significant side effects.     Continue isotretinoin at 60 mg daily. Goal dose is 220mg/kg. Weighs 120 lb (54.5kg); total dose to date is 3900mg (goal dose 12,000mg)    Method of contraception includes: OCP and condoms.    UPT yesterday was negative; continue monthly    Discussion of the risks and side effects of isotretinoin including but not limited to mucocutaneous dryness, arthralgias, myalgias, depression, suicidal ideation, headache, blurred vision, increase in liver function test and increase in lipids. The iPledge program brochure was provided and the contents discussed with the patient. The patient was counseled that they cannot give blood while on isotretinoin. Advised against tattoos and waxing. No personal or family history of inflammatory bowel disease or hypertriglyceridemia known to patient. Reviewed need to avoid alcohol on medication. The iPledge program consent was obtained. Patient counseled that if they wear contacts, the eyes may become too dry to tolerate. Recommend follow up with eye doctor if this occurs.      Discussed need for sun protection, at least SPF 30+.    Repeat labs yesterday 3/4/20  - LFT, fasting lipids - all WNL    Total dose to date: 3900mg/kg     Follow-up in 1 month.             Epi Lott MD  Dermatology Attending

## 2020-03-05 NOTE — NURSING NOTE
Dermatology Rooming Note    Laurie Morales's goals for this visit include:   Chief Complaint   Patient presents with     Derm Problem     Accutane, Laurie states she has not noticed any improvement.     Scarlett Palacios LPN

## 2020-03-05 NOTE — PROGRESS NOTES
Accutane Intake:  Ipledge number:[id number]  Copy of consent retained for medical record:Yes  Baseline and future lab orders placed:Yes  Baseline weight obtained:Yes  Patient registered:Yes               [facility] Dermatology Note        Dermatology Problem List:  1. Acne vulgaris, moderate, inflammatory  - initiated Accutane process 10/24/19   -started isotretinoin 30mg daily 11/26/19  - Previous Tx: doxycycline, Differin, BP wash, clindamycin, tretinoin, minocycline, proactive  - regular menses, currently on OCPs     Encounter Date: March 5, 2020     CC:  Follow up on acne, started isotretinoin 11/26     History of Present Illness:  Ms. Laurie Morales is a 19 year old female who presents today for followup of acne. The patient was last seen 1/30/19 when we increased her Accutane from 40mg daily to 60mg daily (now s/p 30mg x30d + 40mg x 30d + 60mg x 30 d = 3900mg total dose).      Is tolerating medication well.  Mild dryness of skin and lips but tolerable.  Denies significant eye dryness.  Overall noticing improvement of overall face oiliness, but has had occasional acne breakouts on face/chin over the past 1-2 weeks, and with ongoing bumps on forehead.  No new areas of involvement today.        Social History:  Patient reports that she has never smoked. She has never used smokeless tobacco. She reports that she does not drink alcohol or use drugs.        Review of Systems:  -Skin Establ Pt: The patient denies any new rash, pruritus, or lesions that are symptomatic, changing or bleeding, except as per HPI.  -Constitutional: The patient is feeling generally well.  10 point ROS otherwise negative     Physical exam:  GEN: This is a well developed, well-nourished female in no acute distress, in a pleasant mood.    SKIN: Acne exam, which includes the face, neck, bilateral upper extremities  - Superifical acneiform papules with intermixed open and closed comedones on the forehead, clustered on upper  left forehead.    - No other lesions of concern on areas examined.      Impression/Plan:  1. Acne vulgaris, predominantly inflammatory. Patient started isotretinoin three months ago, tolerating well without significant side effects.     Continue isotretinoin at 60 mg daily. Goal dose is 220mg/kg. Weighs 120 lb (54.5kg); total dose to date is 3900mg (goal dose 12,000mg)    Method of contraception includes: OCP and condoms.    UPT yesterday was negative; continue monthly    Discussion of the risks and side effects of isotretinoin including but not limited to mucocutaneous dryness, arthralgias, myalgias, depression, suicidal ideation, headache, blurred vision, increase in liver function test and increase in lipids. The iPledge program brochure was provided and the contents discussed with the patient. The patient was counseled that they cannot give blood while on isotretinoin. Advised against tattoos and waxing. No personal or family history of inflammatory bowel disease or hypertriglyceridemia known to patient. Reviewed need to avoid alcohol on medication. The iPledge program consent was obtained. Patient counseled that if they wear contacts, the eyes may become too dry to tolerate. Recommend follow up with eye doctor if this occurs.      Discussed need for sun protection, at least SPF 30+.    Repeat labs yesterday 3/4/20  - LFT, fasting lipids - all WNL    Total dose to date: 3900mg/kg     Follow-up in 1 month.             Epi Lott MD  Dermatology Attending

## 2020-03-15 ENCOUNTER — HEALTH MAINTENANCE LETTER (OUTPATIENT)
Age: 20
End: 2020-03-15

## 2020-04-08 DIAGNOSIS — Z79.899 ENCOUNTER FOR LONG-TERM (CURRENT) USE OF HIGH-RISK MEDICATION: ICD-10-CM

## 2020-04-08 LAB — HCG UR QL: NEGATIVE

## 2020-04-09 ENCOUNTER — VIRTUAL VISIT (OUTPATIENT)
Dept: DERMATOLOGY | Facility: CLINIC | Age: 20
End: 2020-04-09
Payer: COMMERCIAL

## 2020-04-09 ENCOUNTER — MYC MEDICAL ADVICE (OUTPATIENT)
Dept: DERMATOLOGY | Facility: CLINIC | Age: 20
End: 2020-04-09

## 2020-04-09 DIAGNOSIS — L70.0 ACNE VULGARIS: ICD-10-CM

## 2020-04-09 RX ORDER — ISOTRETINOIN 30 MG/1
60 CAPSULE ORAL DAILY
Qty: 60 CAPSULE | Refills: 0 | Status: SHIPPED | OUTPATIENT
Start: 2020-04-09 | End: 2020-05-07

## 2020-04-09 NOTE — PROGRESS NOTES
Dermatology Phone Encounter  Name of caller: ***  Reason for call: ***  Diagnosis per last MD record: ***  Last visit date: ***  When advised to return to clinic and is this scheduled? ***

## 2020-04-09 NOTE — PROGRESS NOTES
[facility] Method of transmission:  Store and Forward ((National Emergency Concerning the CORONAVIRUS (COVID 19)       Impression and Recommendations (Patient Counseled on the Following):  1. Acne vulgaris, predominantly inflammatory.  Patient started isotretinoin four months ago, tolerating well without significant side effects.  Ongoing improvement.    Continue isotretinoin at 60 mg daily. Goal dose is 220mg/kg. Weighs 120 lb (54.5kg); total dose to date is 5700mg (goal dose 12,000mg)    Method of contraception includes: OCP and condoms.    UPT yesterday was negative; continue monthly checks    Discussion of the risks and side effects of isotretinoin including but not limited to mucocutaneous dryness, arthralgias, myalgias, depression, suicidal ideation, headache, blurred vision, increase in liver function test and increase in lipids. The iPledge program brochure was provided and the contents discussed with the patient. The patient was counseled that they cannot give blood while on isotretinoin. Advised against tattoos and waxing. No personal or family history of inflammatory bowel disease or hypertriglyceridemia known to patient. Reviewed need to avoid alcohol on medication. The iPledge program consent was obtained. Patient counseled that if they wear contacts, the eyes may become too dry to tolerate. Recommend follow up with eye doctor if this occurs.      Discussed need for sun protection, at least SPF 30+.    Repeat labs 3/4/20  - LFT, fasting lipids - all WNL    Total dose to date: 5700mg/kg     Follow-up in 1 month.             Epi Lott MD  Dermatology Attending    _____________________________________________________________________________    Dermatology Problem List:  Acne vulgaris, on isotretinoin    Encounter Date: Apr 9, 2020    CC:   Chief Complaint   Patient presents with     Accutane     No concerns, reports things are going well with Accutane. Pregnancy test negative 4/8.        History of Present Illness:  I have reviewed the teledermatology information and the nursing intake corresponding to this issue. Laurie Morales is a 19 year old female who presents via teledermatology for followup on acne and isotretinoin .  Last seen 3/5/20, at which time we had her continue isotretinoin 60mg daily.  Doing well today, tolerating this dose without problems. Verified that patient continues on OCP and male condoms as two forms of birth control.  Ongoing improvement in acne, no new areas of involvement today.  Mild dryness with isotretinoin is manageable.      ROS: Patient is generally feeling well today. 10 point ROS negative.        Labs: UPT negative    Past Medical History:   Patient Active Problem List   Diagnosis     Mild intermittent asthma     Impetigo     Goiter     Acne vulgaris     Encounter for long-term current use of high risk medication     Past Medical History:   Diagnosis Date     Nonsuppurative otitis media, not specified as acute or chronic 09/17/1999    Recurrent otitis media     Unspecified disease of respiratory system 09/17/1999    RAD     Past Surgical History:   Procedure Laterality Date     HC CREATE EARDRUM OPENING,GEN ANESTH      P.E. [id number]       Social History:  nonsmoker    Family History:  Family History   Problem Relation Age of Onset     Cancer Paternal Grandfather         prostate     Heart Disease Maternal Grandfather         CAB age 63     Cerebrovascular Disease Maternal Grandfather      Anesthesia Reaction No family hx of        Medications:  Current Outpatient Medications   Medication     clindamycin (CLEOCIN T) 1 % solution     ISOtretinoin (ABSORICA) 30 MG capsule     ISOtretinoin (ABSORICA) 30 MG capsule     ISOtretinoin (ACCUTANE) 40 MG capsule     terconazole (TERAZOL 7) 0.4 % cream     TRI-LO-ESTARYLLA 0.18/0.215/0.25 MG-25 MCG per tablet     VENTOLIN  (90 BASE) MCG/ACT Inhaler     No current facility-administered medications for this  visit.           Allergies   Allergen Reactions     No Known Allergies          _____________________________________________________________________________    Teledermatology information:  - Location of patient: Home  - Patient presented as: return  - Location of teledermatologist:  [facility] DERMATOLOGY )  - Reason teledermatology is appropriate:  of National Emergency Regarding Coronavirus disease (COVID 19) Outbreak  - Image quality and interpretability: n/a  - Physician has received verbal consent for a Video/Photos Visit from the patient? n/a  - In-person dermatology visit recommendation: no  - Date of images: n/a  - Service start time:1:15  - Service end time:1:28  - Date of report: 04/09/20

## 2020-04-09 NOTE — NURSING NOTE
Chief Complaint   Patient presents with     Accutane     No concerns, reports things are going well with Accutane. Pregnancy test negative 4/8.

## 2020-04-09 NOTE — PATIENT INSTRUCTIONS
[facility] Teledermatology Visit    When should I call my doctor?    If you are worsening or not improving, please, contact us or seek urgent care as noted below.     Who should I call with questions?    [facility]: [phone]     [facility]: [phone]    If this is a medical emergency and you are unable to reach an ER, Call [phone]

## 2020-05-06 DIAGNOSIS — Z79.899 ENCOUNTER FOR LONG-TERM (CURRENT) USE OF HIGH-RISK MEDICATION: ICD-10-CM

## 2020-05-06 LAB — HCG UR QL: NEGATIVE

## 2020-05-07 ENCOUNTER — VIRTUAL VISIT (OUTPATIENT)
Dept: DERMATOLOGY | Facility: CLINIC | Age: 20
End: 2020-05-07
Payer: COMMERCIAL

## 2020-05-07 DIAGNOSIS — Z79.899 ENCOUNTER FOR LONG-TERM CURRENT USE OF HIGH RISK MEDICATION: Primary | ICD-10-CM

## 2020-05-07 DIAGNOSIS — L70.0 ACNE VULGARIS: ICD-10-CM

## 2020-05-07 RX ORDER — ISOTRETINOIN 30 MG/1
60 CAPSULE ORAL DAILY
Qty: 60 CAPSULE | Refills: 0 | Status: SHIPPED | OUTPATIENT
Start: 2020-05-07 | End: 2020-06-09

## 2020-05-07 NOTE — PROGRESS NOTES
Dermatology Phone Visit    Dermatology Problem List:  Acne vulgaris, on isotretinoin    Patient opted to conduct today's return visit via telephone vs an in person visit to the clinic.    Encounter Date: May 7, 2020    Chief complaint:   Chief Complaint   Patient presents with     Derm Problem     Accutane, no concerns . Laurie states she is starting to see improvement.       I spoke with: Laurie Morales    The reason for the telephone visit was:   Accutane follow-up    Pertinent history and review of systems:  Laurie Morales is a 19 year old female who presents via telephone visit for accutane follow-up. We last saw her via virtual visit on 2020 at which time we continued her on 60 mg of isotretinoin daily. Today she states that she is doing well. She thinks that her skin is improving, but she still does get some break outs. She thinks she gets a break out every 2 weeks, usually just one or two pustules that are not cystic. She endorses dry lips more so than skin. Denies muscle aches, joint pains. Mood has been stable. No other concerns today.    ROS: see HPI; otherwise 10 point ROS negative.    Assessment/Advice/instructions given to patient/guardian including prescriptions, follow up appointment or orders for diagnostic testin. Acne vulgaris, predominantly inflammatory.  Patient started isotretinoin five months ago, tolerating well without significant side effects.  Ongoing improvement.    Continue isotretinoin at 60 mg daily. Goal dose is 220mg/kg. Weighs 120 lb (54.5kg); total dose to date is 7500mg (goal dose 12,000mg)    Method of contraception includes: OCP and condoms.    UPT yesterday was negative; continue monthly checks    Discussion of the risks and side effects of isotretinoin including but not limited to mucocutaneous dryness, arthralgias, myalgias, depression, suicidal ideation, headache, blurred vision, increase in liver function test and increase in lipids. The iPledge program brochure  was provided and the contents discussed with the patient. The patient was counseled that they cannot give blood while on isotretinoin. Advised against tattoos and waxing. No personal or family history of inflammatory bowel disease or hypertriglyceridemia known to patient. Reviewed need to avoid alcohol on medication. The iPledge program consent was obtained. Patient counseled that if they wear contacts, the eyes may become too dry to tolerate. Recommend follow up with eye doctor if this occurs.      Discussed need for sun protection, at least SPF 30+.    Repeat labs 3/4/20  - LFT, fasting lipids - all WNL    Total dose to date: 7500mg/kg    Phone call contact time:  Call Started at: 1:11 pm  Call Ended at: 1:21 pm    Staff and resident:    Ron Adorno MD  Dermatology Resident, PGY-2    Dr. Lott was on the phone for the entire visit and agrees with the assessment and plan as documented in this note.    Epi Lott MD  Dermatology Attending     Phone call duration: 12 minutes  Start time: 1:35  End Time: 1:47

## 2020-05-07 NOTE — PATIENT INSTRUCTIONS
[facility] Teledermatology Visit    Thank you for allowing us to participate in your care. Your findings, instructions and follow-up plan are as follows:    Continue isotretinoin 60 mg daily.  We will follow-up in 1 month.       When should I call my doctor?    If you are worsening or not improving, please, contact us or seek urgent care as noted below.     Who should I call with questions (adults)?    [facility] (adult and pediatric): [phone]     [facility] (adult): [phone]    For urgent needs outside of business hours call the [facility] at [phone] and ask for the dermatology resident on call    If this is a medical emergency and you are unable to reach an ER, Call 911      Who should I call with questions (pediatric)?  [facility]- Pediatric Dermatology  Dr. Petrona Merlos, Dr. Gayatri Tierney, Dr. Justa Hernandez, Clau Brush, PA  Dr. Leslye Choudhury, Dr. Connie Alicia & Dr. Boogie Horne  Non Urgent  Nurse Triage Line; [phone]- Twyla and Mely RN Care Coordinators   Richa (/Complex ) [phone]    If you need a prescription refill, please contact your pharmacy. Refills are approved or denied by our Physicians during normal business hours, Monday through Fridays  Per office policy, refills will not be granted if you have not been seen within the past year (or sooner depending on your child's condition)    Scheduling Information:  Pediatric Appointment Scheduling and Call Center [phone]  Radiology Scheduling- [phone]  Sedation Unit Scheduling- [phone]  [place] Scheduling- General [phone]; Pediatric Dermatology [phone]  Main  Services: [phone]  Panamanian: [phone]  Bruneian: [phone]  Hmong/Wallisian/Tom: [phone]  Preadmission Nursing Department Fax Number: [phone] (Fax all pre-operative  paperwork to this number)    For urgent matters arising during evenings, weekends, or holidays that cannot wait for normal business hours please call [phone] and ask for the Dermatology Resident On-Call to be paged.

## 2020-05-07 NOTE — PROGRESS NOTES
Dermatology Rooming Note    Laurie Morales's goals for this visit include:   Chief Complaint   Patient presents with     Derm Problem     Accutane, no concerns . Laurie states she is starting to see improvement.     Scarlett Palacios LPN

## 2020-06-08 ENCOUNTER — TELEPHONE (OUTPATIENT)
Dept: DERMATOLOGY | Facility: CLINIC | Age: 20
End: 2020-06-08

## 2020-06-08 DIAGNOSIS — Z79.899 ENCOUNTER FOR LONG-TERM (CURRENT) USE OF HIGH-RISK MEDICATION: ICD-10-CM

## 2020-06-08 LAB — HCG UR QL: NEGATIVE

## 2020-06-09 ENCOUNTER — VIRTUAL VISIT (OUTPATIENT)
Dept: DERMATOLOGY | Facility: CLINIC | Age: 20
End: 2020-06-09
Payer: COMMERCIAL

## 2020-06-09 DIAGNOSIS — Z79.899 ENCOUNTER FOR LONG-TERM CURRENT USE OF HIGH RISK MEDICATION: Primary | ICD-10-CM

## 2020-06-09 DIAGNOSIS — L70.0 ACNE VULGARIS: ICD-10-CM

## 2020-06-09 RX ORDER — ISOTRETINOIN 30 MG/1
60 CAPSULE ORAL DAILY
Qty: 60 CAPSULE | Refills: 0 | Status: SHIPPED | OUTPATIENT
Start: 2020-06-09 | End: 2020-07-28

## 2020-06-09 ASSESSMENT — PAIN SCALES - GENERAL: PAINLEVEL: NO PAIN (0)

## 2020-06-09 NOTE — PATIENT INSTRUCTIONS
[facility] Teledermatology Visit    Thank you for allowing us to participate in your care.     When should I call my doctor?    If you are worsening or not improving, please, contact us or seek urgent care as noted below.     Who should I call with questions (adults)?    [facility] (adult and pediatric): [phone]     [facility] (adult): [phone]    For urgent needs outside of business hours call the [facility] at [phone] and ask for the dermatology resident on call    If this is a medical emergency and you are unable to reach an ER, Call 911      Who should I call with questions (pediatric)?  [facility]- Pediatric Dermatology  Dr. Petrona Merlos, Dr. Gayatri Tierney, Dr. Justa Hernandez, Clau Brush, KOFFI Choudhury, Dr. Connie Alicia & Dr. Boogie Horne  Non Urgent  Nurse Triage Line; [phone]- Twyla and Mely BARRY Care Coordinators   Richa (/Complex ) [phone]    If you need a prescription refill, please contact your pharmacy. Refills are approved or denied by our Physicians during normal business hours, Monday through Fridays  Per office policy, refills will not be granted if you have not been seen within the past year (or sooner depending on your child's condition)    Scheduling Information:  Pediatric Appointment Scheduling and Call Center [phone]  Radiology Scheduling- [phone]  Sedation Unit Scheduling- [phone]  [place] Scheduling- General [phone]; Pediatric Dermatology [phone]  Main  Services: [phone]  Armenian: [phone]  Danish: [phone]  Hmong/Maltese/Spanish: [phone]  Preadmission Nursing Department Fax Number: [phone] (Fax all pre-operative paperwork to this number)    For urgent matters arising during evenings, weekends, or holidays that cannot wait for normal business hours  please call [phone] and ask for the Dermatology Resident On-Call to be paged.

## 2020-06-09 NOTE — PROGRESS NOTES
[facility] Record:  Store and Forward and Telephone [id number]      Impression and Recommendations (Patient Counseled on the Following):  Acne vulgaris, predominantly inflammatory.  Patient started isotretinoin five months ago, tolerating well without significant side effects.  Ongoing improvement.    Continue isotretinoin at 60 mg daily. Goal dose is 220mg/kg. Weighs 120 lb (54.5kg); total dose to date is 9300mg (goal dose 12,000mg)    Method of contraception includes: OCP and condoms.    UPT yesterday was negative; continue monthly checks    Discussion of the risks and side effects of isotretinoin including but not limited to mucocutaneous dryness, arthralgias, myalgias, depression, suicidal ideation, headache, blurred vision, increase in liver function test and increase in lipids. The iPledge program brochure was provided and the contents discussed with the patient. The patient was counseled that they cannot give blood while on isotretinoin. Advised against tattoos and waxing. No personal or family history of inflammatory bowel disease or hypertriglyceridemia known to patient. Reviewed need to avoid alcohol on medication. The iPledge program consent was obtained. Patient counseled that if they wear contacts, the eyes may become too dry to tolerate. Recommend follow up with eye doctor if this occurs.     Discussed need for sun protection, at least SPF 30+.    Repeat labs 3/4/20  - LFT, fasting lipids - all WNL    Total dose to date: 9300mg/kg      Staff only      Epi Lott MD  Dermatology Attending      ____________________________________________________________________________    Dermatology Problem List:  Acne vulgaris, on Accutane  - Currently s/p 9300mg; goal dose 12,000mg     Encounter Date: Jun 9, 2020    CC:   Chief Complaint   Patient presents with     Accutane     Cailtlin is doing a accutane follow up        History of Present Illness:  I have reviewed the teledermatology information  and the nursing intake corresponding to this issue. Laurie Morales is a 19 year old female who presents via teledermatology for followup on isotretinoin.  Last visit 5/7/20, plan at that time to continue isotretinoin 60mg daily.  Today she reports ongoing acne improvement and is happy with current results.  Some areas of mild redness on chin and cheeks, but no significant acne flares since last visit.  Ongoing skin and lip dryness with accutane, but manageable with moisturizers.  No new areas of acne involvement.    ROS: Patient is generally feeling well today; see HPI; otherwise 10 point ROS negative.    Physical Examination:  General: Well-appearing, appropriately-developed individual.  Skin: Focused examination within the teledermatology photograph(s) including face and neck was performed.   -Few open comedones on forehead  -Faint pink erythematous 2mm macules on cheeks and chin, without definite papules, no nodulocystic lesions    Labs:  Reviewed in Epic; negative UPT    Past Medical History:   Patient Active Problem List   Diagnosis     Mild intermittent asthma     Impetigo     Goiter     Acne vulgaris     Encounter for long-term current use of high risk medication     Past Medical History:   Diagnosis Date     Nonsuppurative otitis media, not specified as acute or chronic 09/17/1999    Recurrent otitis media     Unspecified disease of respiratory system 09/17/1999    RAD     Past Surgical History:   Procedure Laterality Date     HC CREATE EARDRUM OPENING,GEN ANESTH      P.E. [id number]       Social History:  Patient reports that she has never smoked. She has never used smokeless tobacco. She reports that she does not drink alcohol or use drugs.    Family History:  Family History   Problem Relation Age of Onset     Cancer Paternal Grandfather         prostate     Heart Disease Maternal Grandfather         CAB age 63     Cerebrovascular Disease Maternal Grandfather      Anesthesia Reaction No family hx of         Medications:  Current Outpatient Medications   Medication     ISOtretinoin (ABSORICA) 30 MG capsule     TRI-LO-ESTARYLLA 0.18/0.215/0.25 MG-25 MCG per tablet     VENTOLIN  (90 BASE) MCG/ACT Inhaler     clindamycin (CLEOCIN T) 1 % solution     ISOtretinoin (ABSORICA) 30 MG capsule     ISOtretinoin (ACCUTANE) 40 MG capsule     terconazole (TERAZOL 7) 0.4 % cream     No current facility-administered medications for this visit.           Allergies   Allergen Reactions     No Known Allergies          _____________________________________________________________________________    Teledermatology information:  - Location of patient in [place]: Home  - Patient presented as: return  - Location of teledermatologist:  [facility] DERMATOLOGY )  - Reason teledermatology is appropriate:  of National Emergency Regarding Coronavirus disease (COVID 19) Outbreak  - Image quality and interpretability: acceptable  - Physician has received verbal consent for a Video/Photos Visit from the patient? Yes  - In-person dermatology visit recommendation: no  - Date of images: 6/9/20  - Service start time:11:01  - Service end time:11:13  - Date of report: 6/9/2020

## 2020-06-09 NOTE — NURSING NOTE
Dermatology Rooming Note    Laurie Morales's goals for this visit include:   Chief Complaint   Patient presents with     Accutane     Cailtlin is doing a accutane follow up      ANOOP Bloom

## 2020-06-09 NOTE — LETTER
6/9/2020       RE: Laurie Morales  [address] [place] [address]     Dear Colleague,    Thank you for referring your patient, Laurie Morales, to the [facility] DERMATOLOGY at [facility]. Please see a copy of my visit note below.    [facility]  Store and Forward and Telephone [id number]      Impression and Recommendations (Patient Counseled on the Following):  Acne vulgaris, predominantly inflammatory.  Patient started isotretinoin five months ago, tolerating well without significant side effects.  Ongoing improvement.    Continue isotretinoin at 60 mg daily. Goal dose is 220mg/kg. Weighs 120 lb (54.5kg); total dose to date is 9300mg (goal dose 12,000mg)    Method of contraception includes: OCP and condoms.    UPT yesterday was negative; continue monthly checks    Discussion of the risks and side effects of isotretinoin including but not limited to mucocutaneous dryness, arthralgias, myalgias, depression, suicidal ideation, headache, blurred vision, increase in liver function test and increase in lipids. The iPledge program brochure was provided and the contents discussed with the patient. The patient was counseled that they cannot give blood while on isotretinoin. Advised against tattoos and waxing. No personal or family history of inflammatory bowel disease or hypertriglyceridemia known to patient. Reviewed need to avoid alcohol on medication. The iPledge program consent was obtained. Patient counseled that if they wear contacts, the eyes may become too dry to tolerate. Recommend follow up with eye doctor if this occurs.     Discussed need for sun protection, at least SPF 30+.    Repeat labs 3/4/20  - LFT, fasting lipids - all WNL    Total dose to date: 9300mg/kg      Staff only      Epi Lott MD  Dermatology Attending      ____________________________________________________________________________    Dermatology Problem List:  Acne vulgaris,  on Accutane  - Currently s/p 9300mg; goal dose 12,000mg     Encounter Date: Jun 9, 2020    CC:   Chief Complaint   Patient presents with     Accutane     Cailtlin is doing a accutane follow up        History of Present Illness:  I have reviewed the teledermatology information and the nursing intake corresponding to this issue. Laurie Morales is a 19 year old female who presents via teledermatology for followup on isotretinoin.  Last visit 5/7/20, plan at that time to continue isotretinoin 60mg daily.  Today she reports ongoing acne improvement and is happy with current results.  Some areas of mild redness on chin and cheeks, but no significant acne flares since last visit.  Ongoing skin and lip dryness with accutane, but manageable with moisturizers.  No new areas of acne involvement.    ROS: Patient is generally feeling well today; see HPI; otherwise 10 point ROS negative.    Physical Examination:  General: Well-appearing, appropriately-developed individual.  Skin: Focused examination within the teledermatology photograph(s) including face and neck was performed.   -Few open comedones on forehead  -Faint pink erythematous 2mm macules on cheeks and chin, without definite papules, no nodulocystic lesions    Labs:  Reviewed in Epic; negative UPT    Past Medical History:   Patient Active Problem List   Diagnosis     Mild intermittent asthma     Impetigo     Goiter     Acne vulgaris     Encounter for long-term current use of high risk medication     Past Medical History:   Diagnosis Date     Nonsuppurative otitis media, not specified as acute or chronic 09/17/1999    Recurrent otitis media     Unspecified disease of respiratory system 09/17/1999    RAD     Past Surgical History:   Procedure Laterality Date     HC CREATE EARDRUM OPENING,GEN ANESTH      P.E. [id number]       Social History:  Patient reports that she has never smoked. She has never used smokeless tobacco. She reports that she does not drink alcohol or use  drugs.    Family History:  Family History   Problem Relation Age of Onset     Cancer Paternal Grandfather         prostate     Heart Disease Maternal Grandfather         CAB age 63     Cerebrovascular Disease Maternal Grandfather      Anesthesia Reaction No family hx of        Medications:  Current Outpatient Medications   Medication     ISOtretinoin (ABSORICA) 30 MG capsule     TRI-LO-ESTARYLLA 0.18/0.215/0.25 MG-25 MCG per tablet     VENTOLIN  (90 BASE) MCG/ACT Inhaler     clindamycin (CLEOCIN T) 1 % solution     ISOtretinoin (ABSORICA) 30 MG capsule     ISOtretinoin (ACCUTANE) 40 MG capsule     terconazole (TERAZOL 7) 0.4 % cream     No current facility-administered medications for this visit.           Allergies   Allergen Reactions     No Known Allergies          _____________________________________________________________________________    Teledermatology information:  - Location of patient in [place]: Home  - Patient presented as: return  - Location of teledermatologist:  ([facility] DERMATOLOGY )  - Reason teledermatology is appropriate:  of National Emergency Regarding Coronavirus disease (COVID 19) Outbreak  - Image quality and interpretability: acceptable  - Physician has received verbal consent for a Video/Photos Visit from the patient? Yes  - In-person dermatology visit recommendation: no  - Date of images: 6/9/20  - Service start time:11:01  - Service end time:11:13  - Date of report: 6/9/2020     Again, thank you for allowing me to participate in the care of your patient.      Sincerely,    Epi Lott MD

## 2020-06-11 RX ORDER — ISOTRETINOIN 30 MG/1
60 CAPSULE ORAL DAILY
Qty: 60 CAPSULE | Refills: 0 | OUTPATIENT
Start: 2020-06-11

## 2020-06-11 NOTE — TELEPHONE ENCOUNTER
ISOtretinoin (ABSORICA) 30 MG   Last Written Prescription Date:  11/26/19  Last Fill Quantity: 30,   # refills: 0  Last Office Visit : 6/9/20  Future Office visit:  7/14/20    Routing refill request to provider for review/approval because:  Drug not on the refill protocol Is pt taking med? 2 different/ DOSE  Derm RF 1 mos / DEC 2019

## 2020-06-12 NOTE — TELEPHONE ENCOUNTER
Per chart review the pt was last seen via virtual visit on 6/9/20 at which time a refill was placed for isotretinoin 60mg daily. Refill not appropriate as the medication being requested was already sent.     Yamilka Chauhan MD - PGY-4  Dermatology Resident on Call  [facility] Dermatology

## 2020-07-24 ENCOUNTER — TELEPHONE (OUTPATIENT)
Dept: DERMATOLOGY | Facility: CLINIC | Age: 20
End: 2020-07-24

## 2020-07-27 DIAGNOSIS — Z79.899 ENCOUNTER FOR LONG-TERM (CURRENT) USE OF HIGH-RISK MEDICATION: ICD-10-CM

## 2020-07-27 LAB — HCG UR QL: NEGATIVE

## 2020-07-28 ENCOUNTER — VIRTUAL VISIT (OUTPATIENT)
Dept: DERMATOLOGY | Facility: CLINIC | Age: 20
End: 2020-07-28
Payer: COMMERCIAL

## 2020-07-28 DIAGNOSIS — Z79.899 ENCOUNTER FOR LONG-TERM CURRENT USE OF HIGH RISK MEDICATION: Primary | ICD-10-CM

## 2020-07-28 DIAGNOSIS — L70.0 ACNE VULGARIS: ICD-10-CM

## 2020-07-28 RX ORDER — ISOTRETINOIN 30 MG/1
60 CAPSULE ORAL DAILY
Qty: 60 CAPSULE | Refills: 0 | Status: SHIPPED | OUTPATIENT
Start: 2020-07-28 | End: 2020-08-27

## 2020-07-28 NOTE — PATIENT INSTRUCTIONS
[facility] Dermatology Visit    Thank you for allowing us to participate in your care.     When should I call my doctor?    If you are worsening or not improving, please, contact us or seek urgent care as noted below.     Who should I call with questions (adults)?    [facility] (adult and pediatric): [phone]     [facility] (adult): [phone]    For urgent needs outside of business hours call the [facility] at [phone] and ask for the dermatology resident on call    If this is a medical emergency and you are unable to reach an ER, Call 911      Who should I call with questions (pediatric)?  [facility]- Pediatric Dermatology  Dr. Petrona Merlos, Dr. Gayatri Tierney, Dr. Justa Hernandez, Clau Brush, KOFFI Choudhury, Dr. Connie Alicia & Dr. Boogie Horne  Non Urgent  Nurse Triage Line; [phone]- Twyla and Mely BARRY Care Coordinatoryuri Chaudhry (/Complex ) [phone]    If you need a prescription refill, please contact your pharmacy. Refills are approved or denied by our Physicians during normal business hours, Monday through Fridays  Per office policy, refills will not be granted if you have not been seen within the past year (or sooner depending on your child's condition)    Scheduling Information:  Pediatric Appointment Scheduling and Call Center [phone]  Radiology Scheduling- [phone]  Sedation Unit Scheduling- [phone]  [place] Scheduling- General [phone]; Pediatric Dermatology [phone]  Main  Services: [phone]  Turkish: [phone]  [place]: [phone]  Hmong/Jamaal/Mozambican: [phone]  Preadmission Nursing Department Fax Number: [phone] (Fax all pre-operative paperwork to this number)    For urgent matters arising during evenings, weekends, or holidays that cannot wait for normal business hours  please call [phone] and ask for the Dermatology Resident On-Call to be paged.

## 2020-07-28 NOTE — PROGRESS NOTES
[facility] Dermatology Record:  Store and Forward and Telephone [phone]       Dermatology Problem List:  Acne vulgaris, on Accutane  - Currently s/p 11,100mg; goal dose 12,000mg    Encounter Date: Jul 28, 2020    CC:   Chief Complaint   Patient presents with     Accutane     Laurie is being seen today for a accutane follow up        History of Present Illness:  Laurie Morales is a 19 year old female who presents for followup on acne vulgaris and isotretinoin therapy.    Last seen via virtual visit 6/9/20, plan at that time to continue isotretinoin 60mg daily; she continued this daily dose over the past month.  Today she reports ongoing acne improvement and is very happy with current results.  Has a few small bumps in a perioral distribution, but otherwise has not had any significant flares since last visit.  No new areas of involvement.  Does not believe she has any significant areas of scarring.  Dry skin and dry lips associated with accutane have been progressively easier to manage over the summer.    ROS: Patient is generally feeling well today.  No headaches, vision changes or mood alterations.  Otherwise 10 point ROS negative.    Physical Examination:  General: Well-appearing, appropriately-developed individual.  Skin: Focused examination within the teledermatology photograph(s) including face and neck was performed.   -Rare open comedones on forehead  -Faint pink to hyperpigmented 2mm macules in perioral distribution, without definite papules, no nodulocystic lesions    Labs:  Negative UPT 7/27/20    Past Medical History:   Patient Active Problem List   Diagnosis     Mild intermittent asthma     Impetigo     Goiter     Acne vulgaris     Encounter for long-term current use of high risk medication     Past Medical History:   Diagnosis Date     Nonsuppurative otitis media, not specified as acute or chronic 09/17/1999    Recurrent otitis media     Unspecified disease of respiratory system 09/17/1999    RAD      Past Surgical History:   Procedure Laterality Date     HC CREATE EARDRUM OPENING,GEN ANESTH      P.E. [id number]       Social History:  Patient reports that she has never smoked. She has never used smokeless tobacco. She reports that she does not drink alcohol or use drugs.    Family History:  Family History   Problem Relation Age of Onset     Cancer Paternal Grandfather         prostate     Heart Disease Maternal Grandfather         CAB age 63     Cerebrovascular Disease Maternal Grandfather      Anesthesia Reaction No family hx of        Medications:  Current Outpatient Medications   Medication     ISOtretinoin (ABSORICA) 30 MG capsule     TRI-LO-ESTARYLLA 0.18/0.215/0.25 MG-25 MCG per tablet     VENTOLIN  (90 BASE) MCG/ACT Inhaler     clindamycin (CLEOCIN T) 1 % solution     ISOtretinoin (ABSORICA) 30 MG capsule     ISOtretinoin (ACCUTANE) 40 MG capsule     terconazole (TERAZOL 7) 0.4 % cream     No current facility-administered medications for this visit.           Allergies   Allergen Reactions     No Known Allergies            Impression and Recommendations (Patient Counseled on the Following):  Acne vulgaris, predominantly inflammatory.  Patient started isotretinoin five months ago, tolerating well without significant side effects.  Ongoing improvement.    Continue isotretinoin at 60 mg daily. Goal dose is 220mg/kg. Weighs 120 lb (54.5kg); total dose to date is 11,100mg (goal dose 12,000mg)    Method of contraception includes: OCP and condoms.    UPT yesterday was negative; continue monthly checks    Discussion of the risks and side effects of isotretinoin including but not limited to mucocutaneous dryness, arthralgias, myalgias, depression,  headache, blurred vision, increase in liver function test and increase in lipids.     Re-emphasized the absolute contraindication to pregnancy while taking isotretinoin    Discussed need for sun protection, at least SPF 30+.    Repeat labs 3/4/20  - LFT,  fasting lipids - all WNL    Total dose to date: 11,100mg/kg        Staff only        Epi Lott MD  Dermatology Attending  _____________________________________________________________________________    Teledermatology information:  - Location of patient: [place]  - Patient presented as: return  - Location of teledermatologist:  ([facility] DERMATOLOGY )  - Reason teledermatology is appropriate:  of National Emergency Regarding Coronavirus disease (COVID 19) Outbreak  - Image quality and interpretability: acceptable  - Physician has received verbal consent for a Video/Photos Visit from the patient? Yes  - In-person dermatology visit recommendation: no  - Date of images: 7/27/20  - Service start time:9:03  - Service end time:9:15  - Date of report: 7/28/2020

## 2020-07-28 NOTE — LETTER
7/28/2020       RE: Laurie Morales  [address] [facility] [address]     Dear Colleague,    Thank you for referring your patient, Laurie Morales, to the [facility] DERMATOLOGY at [facility]. Please see a copy of my visit note below.    [facility] Dermatology Record:  Store and Forward and Telephone [phone]       Dermatology Problem List:  Acne vulgaris, on Accutane  - Currently s/p 11,100mg; goal dose 12,000mg    Encounter Date: Jul 28, 2020    CC:   Chief Complaint   Patient presents with     Accutane     Laurie is being seen today for a accutane follow up        History of Present Illness:  Laurie Morales is a 19 year old female who presents for followup on acne vulgaris and isotretinoin therapy.    Last seen via virtual visit 6/9/20, plan at that time to continue isotretinoin 60mg daily; she continued this daily dose over the past month.  Today she reports ongoing acne improvement and is very happy with current results.  Has a few small bumps in a perioral distribution, but otherwise has not had any significant flares since last visit.  No new areas of involvement.  Does not believe she has any significant areas of scarring.  Dry skin and dry lips associated with accutane have been progressively easier to manage over the summer.    ROS: Patient is generally feeling well today.  No headaches, vision changes or mood alterations.  Otherwise 10 point ROS negative.    Physical Examination:  General: Well-appearing, appropriately-developed individual.  Skin: Focused examination within the teledermatology photograph(s) including face and neck was performed.   -Rare open comedones on forehead  -Faint pink to hyperpigmented 2mm macules in perioral distribution, without definite papules, no nodulocystic lesions    Labs:  Negative UPT 7/27/20    Past Medical History:   Patient Active Problem List   Diagnosis     Mild intermittent asthma     Impetigo     Goiter     Acne vulgaris      Encounter for long-term current use of high risk medication     Past Medical History:   Diagnosis Date     Nonsuppurative otitis media, not specified as acute or chronic 09/17/1999    Recurrent otitis media     Unspecified disease of respiratory system 09/17/1999    RAD     Past Surgical History:   Procedure Laterality Date     HC CREATE EARDRUM OPENING,GEN ANESTH      P.E. [id number]       Social History:  Patient reports that she has never smoked. She has never used smokeless tobacco. She reports that she does not drink alcohol or use drugs.    Family History:  Family History   Problem Relation Age of Onset     Cancer Paternal Grandfather         prostate     Heart Disease Maternal Grandfather         CAB age 63     Cerebrovascular Disease Maternal Grandfather      Anesthesia Reaction No family hx of        Medications:  Current Outpatient Medications   Medication     ISOtretinoin (ABSORICA) 30 MG capsule     TRI-LO-ESTARYLLA 0.18/0.215/0.25 MG-25 MCG per tablet     VENTOLIN  (90 BASE) MCG/ACT Inhaler     clindamycin (CLEOCIN T) 1 % solution     ISOtretinoin (ABSORICA) 30 MG capsule     ISOtretinoin (ACCUTANE) 40 MG capsule     terconazole (TERAZOL 7) 0.4 % cream     No current facility-administered medications for this visit.           Allergies   Allergen Reactions     No Known Allergies            Impression and Recommendations (Patient Counseled on the Following):  Acne vulgaris, predominantly inflammatory.  Patient started isotretinoin five months ago, tolerating well without significant side effects.  Ongoing improvement.    Continue isotretinoin at 60 mg daily. Goal dose is 220mg/kg. Weighs 120 lb (54.5kg); total dose to date is 11,100mg (goal dose 12,000mg)    Method of contraception includes: OCP and condoms.    UPT yesterday was negative; continue monthly checks    Discussion of the risks and side effects of isotretinoin including but not limited to mucocutaneous dryness, arthralgias,  myalgias, depression,  headache, blurred vision, increase in liver function test and increase in lipids.     Re-emphasized the absolute contraindication to pregnancy while taking isotretinoin    Discussed need for sun protection, at least SPF 30+.    Repeat labs 3/4/20  - LFT, fasting lipids - all WNL    Total dose to date: 11,100mg/kg        Staff only        Epi Lott MD  Dermatology Attending  _____________________________________________________________________________    Teledermatology information:  - Location of patient: [place]  - Patient presented as: return  - Location of teledermatologist:  ([facility] DERMATOLOGY )  - Reason teledermatology is appropriate:  of National Emergency Regarding Coronavirus disease (COVID 19) Outbreak  - Image quality and interpretability: acceptable  - Physician has received verbal consent for a Video/Photos Visit from the patient? Yes  - In-person dermatology visit recommendation: no  - Date of images: 7/27/20  - Service start time:9:03  - Service end time:9:15  - Date of report: 7/28/2020     Again, thank you for allowing me to participate in the care of your patient.      Sincerely,    Epi Lott MD

## 2020-07-28 NOTE — NURSING NOTE
Dermatology Rooming Note    Laurie Morales's goals for this visit include:   Chief Complaint   Patient presents with     Accutane     Laurie is being seen today for a accutane follow up      ANOOP Bloom

## 2020-08-27 ENCOUNTER — VIRTUAL VISIT (OUTPATIENT)
Dept: DERMATOLOGY | Facility: CLINIC | Age: 20
End: 2020-08-27
Payer: COMMERCIAL

## 2020-08-27 DIAGNOSIS — Z79.899 ENCOUNTER FOR LONG-TERM (CURRENT) USE OF HIGH-RISK MEDICATION: ICD-10-CM

## 2020-08-27 DIAGNOSIS — L70.0 ACNE VULGARIS: Primary | ICD-10-CM

## 2020-08-27 LAB — HCG UR QL: NEGATIVE

## 2020-08-27 RX ORDER — TRETINOIN 0.25 MG/G
CREAM TOPICAL
Qty: 45 G | Refills: 3 | Status: SHIPPED | OUTPATIENT
Start: 2020-08-27 | End: 2021-02-18

## 2020-08-27 ASSESSMENT — PAIN SCALES - GENERAL: PAINLEVEL: NO PAIN (0)

## 2020-08-27 NOTE — LETTER
8/27/2020       RE: Laurie Morales  [address] [facility] [address]     Dear Colleague,    Thank you for referring your patient, Laurie Morales, to the [facility] DERMATOLOGY at [facility]. Please see a copy of my visit note below.    [facility] Dermatology Record:  Store and forward and Telephone [phone]    Dermatology Problem List:  1. Acne vulgaris, on Accutane  - s/p isotretioin 12,900mg (236.7mg/kg) discontinued 8/26/20    Encounter Date: Aug 27, 2020    CC:   Chief Complaint   Patient presents with     Accutane     Laurie is following up on accutane.      History of Present Illness:  Laurie Morales is a 19 year old female who presents for acne/isotretinoin follow-up. Reports she is doing great, no breakouts in the past few moths. Skin is really clear. No new side effects to report. She has about 3 days of medication left to take. She has no other concerns today. The patient reports tolerable mucocutaneous dryness, and denies arthralgias, myalgias, depression, suicidal ideation, diarrhea, headache, or blurred vision.      ROS: Patient is generally feeling well today   -Neuro: no HA or vision changes  -GI: No nausea, blood in stool, diarrhea, hx of IBD  -Psych: no depression/anxiety, mood changes, or sleep problems   -Musculoskeletal: no joint or muscle pain or swelling   -Heme/Lymph: no concerning bumps, no bleeding problems  -Constitutional: no unintended weight loss/gain, no night sweats or fevers  -Skin as per HPI    Physical Examination:  General: Well-appearing, appropriately-developed individual.  Skin: Focused examination including face was performed.   -skin is clear, no active papules, pustules or comedones  -few hyperpigmented macules on the lower face, but improving    Labs:  Hcg negative, will repeat 30 days after last dose    Past Medical History:   Patient Active Problem List   Diagnosis     Mild intermittent asthma     Impetigo     Goiter     Acne  vulgaris     Encounter for long-term current use of high risk medication     Past Medical History:   Diagnosis Date     Nonsuppurative otitis media, not specified as acute or chronic 09/17/1999    Recurrent otitis media     Unspecified disease of respiratory system 09/17/1999    RAD     Past Surgical History:   Procedure Laterality Date     HC CREATE EARDRUM OPENING,GEN ANESTH      P.E. [id number]     Social History:  Patient reports that she has never smoked. She has never used smokeless tobacco. She reports that she does not drink alcohol or use drugs.    Family History:  Family History   Problem Relation Age of Onset     Cancer Paternal Grandfather         prostate     Heart Disease Maternal Grandfather         CAB age 63     Cerebrovascular Disease Maternal Grandfather      Anesthesia Reaction No family hx of      Medications:  Current Outpatient Medications   Medication     ISOtretinoin (ABSORICA) 30 MG capsule     TRI-LO-ESTARYLLA 0.18/0.215/0.25 MG-25 MCG per tablet     VENTOLIN  (90 BASE) MCG/ACT Inhaler     clindamycin (CLEOCIN T) 1 % solution     ISOtretinoin (ABSORICA) 30 MG capsule     ISOtretinoin (ACCUTANE) 40 MG capsule     terconazole (TERAZOL 7) 0.4 % cream     No current facility-administered medications for this visit.      Allergies   Allergen Reactions     No Known Allergies      Impression and Recommendations (Patient Counseled on the Following):  1. Acne vulgaris, predominantly inflammatory.  Patient started isotretinoin 6 months ago, tolerating well without significant side effects.  Ongoing improvement.    Disontinue isotretinoin. Goal dose is 220mg/kg. Weighs 120 lb (54.5kg); total dose to date is 12,900mg (goal dose 12,000mg)     Method of contraception includes: OCP and condoms.    UPT yesterday was negative; will repeat 30 days after final dose    Discussion of the risks and side effects of isotretinoin including but not limited to mucocutaneous dryness, arthralgias, myalgias,  depression,  headache, blurred vision, increase in liver function test and increase in lipids.     Re-emphasized the absolute contraindication to pregnancy while taking isotretinoin    Discussed need for sun protection, at least SPF 30+.    Repeat labs 3/4/20  - LFT, fasting lipids - all WNL    Total dose to date: 12,900mg/kg (236.7mg/kg)    Start tretinoin 0.05% cream QHS    Follow-up:   Follow-up with dermatology in approximately 3mo. Earlier for new or changing lesions or rash.   CC Dr. Alicia at close of this encounter     Staff only    All risks, benefits and alternatives were discussed with patient.  Patient is in agreement and understands the assessment and plan.  All questions were answered.    Slime Hernandez PA-C, MPAS  [facility] [place]: Phone: [phone], Fax: [phone]  [facility]: Phone: [phone],  Fax: [phone]  _____________________________________________________________________________    Teledermatology information:  - Location of patient: [place]  - Patient presented as: return  - Location of teledermatologist:  ([facility] DERMATOLOGY )  - Reason teledermatology is appropriate:  of National Emergency Regarding Coronavirus disease (COVID 19) Outbreak  - Image quality and interpretability: acceptable  - Physician has received verbal consent for a Video/Photos Visit from the patient? Yes  - In-person dermatology visit recommendation: no  - Date of images: 8/27/20  - Length of call: 8min  - Date of report: 8/27/2020

## 2020-08-27 NOTE — PATIENT INSTRUCTIONS
[facility] Dermatology Visit    Thank you for allowing us to participate in your care. Your findings, instructions and follow-up plan are as follows:    Acne  -discontinue isotretinoin  -Start tretinoin 0.025% cream after you are off isotretinoin for 1mo  -continue with sun protection    When should I call my doctor?    If you are worsening or not improving, please, contact us or seek urgent care as noted below.     Who should I call with questions (adults)?    [facility] (adult and pediatric): [phone]     [facility] (adult): [phone]    For urgent needs outside of business hours call the [facility] at [phone] and ask for the dermatology resident on call    If this is a medical emergency and you are unable to reach an ER, Call [phone]      Who should I call with questions (pediatric)?  [facility]- Pediatric Dermatology  Dr. Petrona Merlos, Dr. Gayatri Tierney, Dr. Justa Hernandez, Clau Varelanhrody, PA  Dr. Leslye Choudhury, Dr. Connie Alicia & Dr. Boogie Horne  Non Urgent  Nurse Triage Line; [phone]- Twyla and Mely RN Care Coordinators   [place] (/Complex ) [phone]    If you need a prescription refill, please contact your pharmacy. Refills are approved or denied by our Physicians during normal business hours, Monday through Fridays  Per office policy, refills will not be granted if you have not been seen within the past year (or sooner depending on your child's condition)    Scheduling Information:  Pediatric Appointment Scheduling and Call Center [phone]  Radiology Scheduling- [phone]  Sedation Unit Scheduling- [phone]  [place] Scheduling- General [phone]; Pediatric Dermatology [phone]  Main  Services: [phone]  [place]wana: [phone]  Kittitian: [phone]  Hmong/Amharic/Montenegrin: [phone]  Preadmission Nursing  Department Fax Number: [phone] (Fax all pre-operative paperwork to this number)    For urgent matters arising during evenings, weekends, or holidays that cannot wait for normal business hours please call [phone] and ask for the Dermatology Resident On-Call to be paged.

## 2020-08-27 NOTE — NURSING NOTE
Dermatology Rooming Note    Laurie Morales's goals for this visit include:   Chief Complaint   Patient presents with     Accutane     Laurie is following up on accutane.      ANOOP Cheatham

## 2020-09-24 DIAGNOSIS — Z79.899 ENCOUNTER FOR LONG-TERM (CURRENT) USE OF HIGH-RISK MEDICATION: ICD-10-CM

## 2020-09-24 LAB — HCG UR QL: NEGATIVE

## 2020-11-17 ENCOUNTER — VIRTUAL VISIT (OUTPATIENT)
Dept: DERMATOLOGY | Facility: CLINIC | Age: 20
End: 2020-11-17
Payer: COMMERCIAL

## 2020-11-17 DIAGNOSIS — L70.0 ACNE VULGARIS: Primary | ICD-10-CM

## 2020-11-17 PROCEDURE — 99212 OFFICE O/P EST SF 10 MIN: CPT | Mod: GQ | Performed by: DERMATOLOGY

## 2020-11-17 NOTE — LETTER
"11/17/2020       RE: Laurie Morales  [address] [facility] [address]     Dear Colleague,    Thank you for referring your patient, Laurie Morales, to the [facility] DERMATOLOGY CLINIC [place] at [facility]. Please see a copy of my visit note below.    [facility] Dermatology Record:  Store and Forward and Telephone [phone]      Dermatology Problem List:  1. Acne vulgaris, status-post Accutane  - s/p isotretioin 12,900mg (236.7mg/kg) discontinued 8/26/20    Encounter Date: Nov 17, 2020    CC:   Chief Complaint   Patient presents with     Derm Problem     Laurie is having a virtual visit for acne. She states \" I am post accutane and no acne\"       History of Present Illness:  Laurie Morales is a 19 year old female who presents for 3 month followup post-isotretinoin.  She was last seen via telederm visit 8/27/20, at which time she was at her goal dose (>220mg/kg), and the plan was to stop the medication and start gentle skin care only.  Today Laurie reports that she is doing very well.  She has not had any breakouts since stopping the medication and does not have any areas that she thinks are persistent scars.  She is not experiencing persistent skin dryness or any other notable side effects.  She is happy she decided to do isotretinoin.      ROS: Patient is generally feeling well today.  No fevers or chills.  No cough or shortness of breath.    Physical Examination:  General: Well-appearing, appropriately-developed individual.  Skin: Focused examination including face and neck was performed.   - Rare light pink (post-inflammatory) 2mm macules on cheeks  - No acneiform papules or pustules    Labs:  Preg test 9/24/20 negative    Past Medical History:   Patient Active Problem List   Diagnosis     Mild intermittent asthma     Impetigo     Goiter     Acne vulgaris     Encounter for long-term current use of high risk medication     Past Medical History:   Diagnosis " Date     Nonsuppurative otitis media, not specified as acute or chronic 09/17/1999    Recurrent otitis media     Unspecified disease of respiratory system 09/17/1999    RAD     Past Surgical History:   Procedure Laterality Date     HC CREATE EARDRUM OPENING,GEN ANESTH      P.E. [id number]       Social History:  Patient reports that she has never smoked. She has never used smokeless tobacco. She reports that she does not drink alcohol or use drugs.    Family History:  Family History   Problem Relation Age of Onset     Cancer Paternal Grandfather         prostate     Heart Disease Maternal Grandfather         CAB age 63     Cerebrovascular Disease Maternal Grandfather      Anesthesia Reaction No family hx of        Medications:  Current Outpatient Medications   Medication     tretinoin (RETIN-A) 0.025 % external cream     VENTOLIN  (90 BASE) MCG/ACT Inhaler     TRI-LO-ESTARYLLA 0.18/0.215/0.25 MG-25 MCG per tablet     No current facility-administered medications for this visit.           Allergies   Allergen Reactions     No Known Allergies            Impression and Recommendations (Patient Counseled on the Following):  1. . Acne vulgaris, predominantly inflammatory.  Patient has now completed a full course of isotretinoin, and is now continuing to do very well off medication for nearly 3 months.    Total dose to date is 12,900mg (goal dose was 12,000mg); has stopped medication     Discussed importance of ongoing gentle skin care, noncomedogenic moisturizers, cosmetic products    If minor flare ups then start tretinoin 0.05% cream at bedtime    Followup in 3 months       Epi Lott MD  Dermatology Attending      _____________________________________________________________________________    Teledermatology information:  - Location of patient: [place]  - Patient presented as: return  - Location of teledermatologist:  ([facility] DERMATOLOGY CLINIC [place] )  - Reason teledermatology is  appropriate:  of National Emergency Regarding Coronavirus disease (COVID 19) Outbreak  - Image quality and interpretability: acceptable  - Physician has received verbal consent for a Video/Photos Visit from the patient? YES  - In-person dermatology visit recommendation: no  - Date of images: 11/17/20  - Service start time:9:43  - Service end time:9:50  - Date of report: 11/17/2020

## 2020-11-17 NOTE — NURSING NOTE
"Chief Complaint   Patient presents with     Derm Problem     Laurie is having a virtual visit for acne. She states \" I am post accutane and no acne\"     Connie Hammond, RMTYRON  "

## 2020-11-17 NOTE — PROGRESS NOTES
" [employer] Dermatology Record:  Store and Forward and Telephone [phone]      Dermatology Problem List:  1. Acne vulgaris, status-post Accutane  - s/p isotretioin 12,900mg (236.7mg/kg) discontinued 8/26/20    Encounter Date: Nov 17, 2020    CC:   Chief Complaint   Patient presents with     Derm Problem     Laurie is having a virtual visit for acne. She states \" I am post accutane and no acne\"       History of Present Illness:  Laurie Morales is a 19 year old female who presents for 3 month followup post-isotretinoin.  She was last seen via telederm visit 8/27/20, at which time she was at her goal dose (>220mg/kg), and the plan was to stop the medication and start gentle skin care only.  Today Laurie reports that she is doing very well.  She has not had any breakouts since stopping the medication and does not have any areas that she thinks are persistent scars.  She is not experiencing persistent skin dryness or any other notable side effects.  She is happy she decided to do isotretinoin.      ROS: Patient is generally feeling well today.  No fevers or chills.  No cough or shortness of breath.    Physical Examination:  General: Well-appearing, appropriately-developed individual.  Skin: Focused examination including face and neck was performed.   - Rare light pink (post-inflammatory) 2mm macules on cheeks  - No acneiform papules or pustules    Labs:  Preg test 9/24/20 negative    Past Medical History:   Patient Active Problem List   Diagnosis     Mild intermittent asthma     Impetigo     Goiter     Acne vulgaris     Encounter for long-term current use of high risk medication     Past Medical History:   Diagnosis Date     Nonsuppurative otitis media, not specified as acute or chronic 09/17/1999    Recurrent otitis media     Unspecified disease of respiratory system 09/17/1999    RAD     Past Surgical History:   Procedure Laterality Date     HC CREATE EARDRUM OPENING,GEN ANESTH      P.E. [id number]       Social " History:  Patient reports that she has never smoked. She has never used smokeless tobacco. She reports that she does not drink alcohol or use drugs.    Family History:  Family History   Problem Relation Age of Onset     Cancer Paternal Grandfather         prostate     Heart Disease Maternal Grandfather         CAB age 63     Cerebrovascular Disease Maternal Grandfather      Anesthesia Reaction No family hx of        Medications:  Current Outpatient Medications   Medication     tretinoin (RETIN-A) 0.025 % external cream     VENTOLIN  (90 BASE) MCG/ACT Inhaler     TRI-LO-ESTARYLLA 0.18/0.215/0.25 MG-25 MCG per tablet     No current facility-administered medications for this visit.           Allergies   Allergen Reactions     No Known Allergies            Impression and Recommendations (Patient Counseled on the Following):  1. . Acne vulgaris, predominantly inflammatory.  Patient has now completed a full course of isotretinoin, and is now continuing to do very well off medication for nearly 3 months.    Total dose to date is 12,900mg (goal dose was 12,000mg); has stopped medication     Discussed importance of ongoing gentle skin care, noncomedogenic moisturizers, cosmetic products    If minor flare ups then start tretinoin 0.05% cream at bedtime    Followup in 3 months       Epi Lott MD  Dermatology Attending      _____________________________________________________________________________    Teledermatology information:  - Location of patient: [place]  - Patient presented as: return  - Location of teledermatologist:  ([facility] [place] )  - Reason teledermatology is appropriate:  of National Emergency Regarding Coronavirus disease (COVID 19) Outbreak  - Image quality and interpretability: acceptable  - Physician has received verbal consent for a Video/Photos Visit from the patient? YES  - In-person dermatology visit recommendation: no  - Date of images: 11/17/20  -  Service start time:9:43  - Service end time:9:50  - Date of report: 11/17/2020

## 2021-01-09 ENCOUNTER — HEALTH MAINTENANCE LETTER (OUTPATIENT)
Age: 21
End: 2021-01-09

## 2021-01-22 NOTE — TELEPHONE ENCOUNTER
DIAGNOSIS: Left hip pAIN   APPOINTMENT DATE: 1/23/21   NOTES STATUS DETAILS   OFFICE NOTE from referring provider N/A    OFFICE NOTE from other specialist N/A    DISCHARGE SUMMARY from hospital N/A    DISCHARGE REPORT from the ER N/A    OPERATIVE REPORT N/A    EMG report N/A    MEDICATION LIST Internal    MRI N/A    DEXA (osteoporosis/bone health) N/A    CT SCAN N/A    XRAYS (IMAGES & REPORTS) N/A

## 2021-01-23 ENCOUNTER — PRE VISIT (OUTPATIENT)
Dept: ORTHOPEDICS | Facility: CLINIC | Age: 21
End: 2021-01-23

## 2021-01-23 ENCOUNTER — OFFICE VISIT (OUTPATIENT)
Dept: ORTHOPEDICS | Facility: CLINIC | Age: 21
End: 2021-01-23
Payer: COMMERCIAL

## 2021-01-23 ENCOUNTER — ANCILLARY PROCEDURE (OUTPATIENT)
Dept: GENERAL RADIOLOGY | Facility: CLINIC | Age: 21
End: 2021-01-23
Attending: FAMILY MEDICINE
Payer: COMMERCIAL

## 2021-01-23 VITALS — BODY MASS INDEX: 20.14 KG/M2 | HEIGHT: 64 IN | WEIGHT: 118 LBS

## 2021-01-23 DIAGNOSIS — M25.552 LEFT HIP PAIN: ICD-10-CM

## 2021-01-23 DIAGNOSIS — M25.552 LEFT HIP PAIN: Primary | ICD-10-CM

## 2021-01-23 PROCEDURE — 99203 OFFICE O/P NEW LOW 30 MIN: CPT | Performed by: FAMILY MEDICINE

## 2021-01-23 PROCEDURE — 73502 X-RAY EXAM HIP UNI 2-3 VIEWS: CPT | Mod: LT | Performed by: RADIOLOGY

## 2021-01-23 ASSESSMENT — MIFFLIN-ST. JEOR: SCORE: 1280.24

## 2021-01-23 NOTE — LETTER
1/23/2021         RE: Laurie Morales  [address] [facility] [id number]        Dear Colleague,    Thank you for referring your patient, Laurie Morales, to the [facility]OPED[facility] [place]. Please see a copy of my visit note below.          SPORTS & ORTHOPEDIC WALK-IN VISIT 1/23/2021    Primary Care Physician: Dr. English Ref-Primary, Physician     Here today for left foot pain.  Summer started having pain in the posterior left hip.  Used to occur with running.  Running and pain improved.  Recently she began running again and is now having more groin pain.  Also has pain when seated in a hip flexed position for long period of time.  No clicking locking or catching.  Pain improves with rest.      Reason for visit:     What part of your body is injured / painful?  left hip    What caused the injury /pain? Overuse injury from regular activity from running     How long ago did your injury occur or pain begin? Summer 2020    What are your most bothersome symptoms? Pain    How would you characterize your symptom?  aching and sharp    What makes your symptoms better? Rest    What makes your symptoms worse? Movement with running     Have you been previously seen for this problem? No    Medical History:    Any recent changes to your medical history? No    Any new medication prescribed since last visit? No    Have you had surgery on this body part before? No    Social History:    Occupation: Student     Handedness: Right    Exercise: 4-5 days/week    Review of Systems:    Do you have fever, chills, weight loss? No    Do you have any vision problems? No    Do you have any chest pain or edema? No    Do you have any shortness of breath or wheezing?  No    Do you have stomach problems? No    Do you have any numbness or focal weakness? No    Do you have diabetes? No    Do you have problems with bleeding or clotting? No    Do you have an rashes or other skin lesions? No       Past Medical History,  "Current Medications, and Allergies are reviewed in the electronic medical record as appropriate.       EXAM:Ht 1.626 m (5' 4\")   Wt 53.5 kg (118 lb)   BMI 20.25 kg/m      Exam:  Patient is alert, in no acute distress, pleasant and conversational.    Gait: Nonantalgic.  Normal heel toe gait    Standing Exam:  Lumbar spine AROM normal.     left Hip:  Supine PROM:  Flexion: Approximately 115 , no tenderness.  External rotation: approximately 60 , no tenderness.  Internal rotation: Approximately 30 , no tenderness  No subjective pain reported with range of motion testing. ROM IS symmetric with uninjured side     Strength Testing:  Hip flexion: 5/5.  Hip adduction: 5/5.  Hip abduction: 4+/5.  No subjective pain reported with strength testing. Strength IS symmetric with uninjured side.     Palpation:  negative tenderness to palpation over the greater trochanter.  negative tenderness to palpation over psoas  negative tenderness to palpation over ASIS  negative tenderness to palpation over Iliac crest  negative tenderness to palpation along the piriformis.  negative tenderness to palpation of the SI joint    Special Tests:  negative Marielos's test.   negative FLOYD's test  positive  FADIR's test  positive  Scour test  negative Reported pain with resisted hip flexion to opposite shoulder     Neurovascularly intact in bilateral lower extremities        Imagin view x-rays of the left hip are performed and reviewed independently demonstrating very mild pincer type deformity.  No acute fracture or dislocation.  See EMR for formal radiology report.      Assessment: Patient is a 20 year old female with left hip pain consistent with SILVINO.    Recommendations:   Reviewed imaging and assessment with the patient in detail  Reviewed the pathophysiology of SILVINO and potential treatment options.  Initially recommended course of physical therapy.  She was provided with some home exercises to start on her own.  Recommended continue activity " as tolerated at this time, though a more dedicated period of rest may be necessary.  Discussed exercises with increase to flexion may be more bothersome for her at the current time.  Follow-up in 4 6 weeks if she is not improving.    Young Ibarra MD

## 2021-01-23 NOTE — PROGRESS NOTES
"      SPORTS & ORTHOPEDIC WALK-IN VISIT 1/23/2021    Primary Care Physician: Dr. English Ref-Primary, Physician     Here today for left foot pain.  Summer started having pain in the posterior left hip.  Used to occur with running.  Running and pain improved.  Recently she began running again and is now having more groin pain.  Also has pain when seated in a hip flexed position for long period of time.  No clicking locking or catching.  Pain improves with rest.      Reason for visit:     What part of your body is injured / painful?  left hip    What caused the injury /pain? Overuse injury from regular activity from running     How long ago did your injury occur or pain begin? Summer 2020    What are your most bothersome symptoms? Pain    How would you characterize your symptom?  aching and sharp    What makes your symptoms better? Rest    What makes your symptoms worse? Movement with running     Have you been previously seen for this problem? No    Medical History:    Any recent changes to your medical history? No    Any new medication prescribed since last visit? No    Have you had surgery on this body part before? No    Social History:    Occupation: Student     Handedness: Right    Exercise: 4-5 days/week    Review of Systems:    Do you have fever, chills, weight loss? No    Do you have any vision problems? No    Do you have any chest pain or edema? No    Do you have any shortness of breath or wheezing?  No    Do you have stomach problems? No    Do you have any numbness or focal weakness? No    Do you have diabetes? No    Do you have problems with bleeding or clotting? No    Do you have an rashes or other skin lesions? No       Past Medical History, Current Medications, and Allergies are reviewed in the electronic medical record as appropriate.       EXAM:Ht 1.626 m (5' 4\")   Wt 53.5 kg (118 lb)   BMI 20.25 kg/m      Exam:  Patient is alert, in no acute distress, pleasant and conversational.    Gait: Nonantalgic.  " Normal heel toe gait    Standing Exam:  Lumbar spine AROM normal.     left Hip:  Supine PROM:  Flexion: Approximately 115 , no tenderness.  External rotation: approximately 60 , no tenderness.  Internal rotation: Approximately 30 , no tenderness  No subjective pain reported with range of motion testing. ROM IS symmetric with uninjured side     Strength Testing:  Hip flexion: 5/5.  Hip adduction: 5/5.  Hip abduction: 4+/5.  No subjective pain reported with strength testing. Strength IS symmetric with uninjured side.     Palpation:  negative tenderness to palpation over the greater trochanter.  negative tenderness to palpation over psoas  negative tenderness to palpation over ASIS  negative tenderness to palpation over Iliac crest  negative tenderness to palpation along the piriformis.  negative tenderness to palpation of the SI joint    Special Tests:  negative Marielos's test.   negative FLOYD's test  positive  FADIR's test  positive  Scour test  negative Reported pain with resisted hip flexion to opposite shoulder     Neurovascularly intact in bilateral lower extremities        Imagin view x-rays of the left hip are performed and reviewed independently demonstrating very mild pincer type deformity.  No acute fracture or dislocation.  See EMR for formal radiology report.      Assessment: Patient is a 20 year old female with left hip pain consistent with SILVINO.    Recommendations:   Reviewed imaging and assessment with the patient in detail  Reviewed the pathophysiology of SILVINO and potential treatment options.  Initially recommended course of physical therapy.  She was provided with some home exercises to start on her own.  Recommended continue activity as tolerated at this time, though a more dedicated period of rest may be necessary.  Discussed exercises with increase to flexion may be more bothersome for her at the current time.  Follow-up in 4 6 weeks if she is not improving.    Young Ibarra MD

## 2021-02-02 ENCOUNTER — THERAPY VISIT (OUTPATIENT)
Dept: PHYSICAL THERAPY | Facility: CLINIC | Age: 21
End: 2021-02-02
Payer: COMMERCIAL

## 2021-02-02 DIAGNOSIS — M25.552 LEFT HIP PAIN: ICD-10-CM

## 2021-02-02 PROCEDURE — 97110 THERAPEUTIC EXERCISES: CPT | Mod: GP | Performed by: PHYSICAL THERAPIST

## 2021-02-02 PROCEDURE — 97161 PT EVAL LOW COMPLEX 20 MIN: CPT | Mod: GP | Performed by: PHYSICAL THERAPIST

## 2021-02-02 PROCEDURE — 97530 THERAPEUTIC ACTIVITIES: CPT | Mod: GP | Performed by: PHYSICAL THERAPIST

## 2021-02-02 ASSESSMENT — ACTIVITIES OF DAILY LIVING (ADL)
WALKING_INITIALLY: NO DIFFICULTY AT ALL
RECREATIONAL_ACTIVITIES: NO DIFFICULTY AT ALL
PUTTING_ON_SOCKS_AND_SHOES: NO DIFFICULTY AT ALL
HOS_ADL_ITEM_SCORE_TOTAL: 65
WALKING_APPROXIMATELY_10_MINUTES: NO DIFFICULTY AT ALL
GETTING_INTO_AND_OUT_OF_A_BATHTUB: NO DIFFICULTY AT ALL
WALKING_DOWN_STEEP_HILLS: NO DIFFICULTY AT ALL
STEPPING_UP_AND_DOWN_CURBS: NO DIFFICULTY AT ALL
GOING_UP_1_FLIGHT_OF_STAIRS: NO DIFFICULTY AT ALL
TWISTING/PIVOTING_ON_INVOLVED_LEG: SLIGHT DIFFICULTY
HEAVY_WORK: NO DIFFICULTY AT ALL
DEEP_SQUATTING: SLIGHT DIFFICULTY
LIGHT_TO_MODERATE_WORK: NO DIFFICULTY AT ALL
GETTING_INTO_AND_OUT_OF_AN_AVERAGE_CAR: SLIGHT DIFFICULTY
WALKING_15_MINUTES_OR_GREATER: NO DIFFICULTY AT ALL
HOS_ADL_SCORE(%): 95.59
GOING_DOWN_1_FLIGHT_OF_STAIRS: NO DIFFICULTY AT ALL
ROLLING_OVER_IN_BED: NO DIFFICULTY AT ALL
HOS_ADL_COUNT: 17
STANDING_FOR_15_MINUTES: NO DIFFICULTY AT ALL
HOS_ADL_HIGHEST_POTENTIAL_SCORE: 68
SITTING_FOR_15_MINUTES: NO DIFFICULTY AT ALL
WALKING_UP_STEEP_HILLS: NO DIFFICULTY AT ALL

## 2021-02-02 NOTE — PROGRESS NOTES
KEY PT FINDINGS:  1) L hip decreased IR      Physical Therapy Initial Evaluation: Subjective History     Injury/Condition Details:  Presenting Complaint L hip pain - back/lateral and groin   Onset Timing/Date 6 months ago   Mechanism Gradually with running     Symptom Behavior Details    Primary Symptoms pain (Location: Groin, Quality: Aching/Throbbing)   Worst Pain 3-4/10 (with symptom provocators)   Symptom Provocators Squats, lunges, sitting prolonged periods in hip flexion   Best Pain 0/10    Symptom Relievers Lying flat   Time of day dependent? No   Recent symptom change? symptoms worsening     Prior Testing/Intervention for current condition:  Prior Tests  x-ray   Prior Treatment none     Lifestyle & General Medical History:  Employment Work retail part time, [employer] - just graduated   Usual physical activities  (within past year) Walking (training for backpacking trip), Running (hasnt for a couple months-starting late Month), classic skiing   Orthopaedic history See Epic Chart   Notable medical history See Epic Chart   Patient Reported Health excellent       Lower Extremity Physical Therapy Examination    Dynamic Movement Screen:  2 leg stance: equal weight bearing  2 leg squat: increased anterior knee excursion    1 leg stance: Right: normal; Left: normal  1 leg squat:   Right: dynamic valgus  Left: dynamic valgus             Hip Joint ROM   Flex Ext ER IR ABD ADD   Right WNL deg WNL deg WNL deg 30 deg WNL deg WNL deg   Left WNL deg WNL deg WNL deg 25 deg WNL deg WNL deg     Lower Extremity Muscle Strength (x/5)   Hip IR Hip ER Knee Ext Hip ABD Hip Ext Knee Flex   Right  4+/5 4+/5 5/5 5/5 5/5 5/5   Left 4/5 4/5 5/5 4+/5 5/5 5/5     Basic Muscle Activation:  Transversus Abdominus: WNL  Quadriceps: Right: WNL, Left: WNL  Gluteal: Right: WNL, Left: WNL       Hip Special Testing:  Test Right Left   FADDIR (-) (+)       Assessment/Plan:    Patient is a 20 year old female with left side hip complaints.    Patient has  the following significant findings with corresponding treatment plan.                Diagnosis 1:  Left hip pain  Pain -  hot/cold therapy, US, electric stimulation, manual therapy, splint/taping/bracing/orthotics, self management, education and home program  Decreased ROM/flexibility - manual therapy and therapeutic exercise  Decreased joint mobility - manual therapy and therapeutic exercise  Decreased strength - therapeutic exercise and therapeutic activities  Decreased function - therapeutic activities    Therapy Evaluation Codes:   1) History comprised of:   Personal factors that impact the plan of care:      None.    Comorbidity factors that impact the plan of care are:      None.     Medications impacting care: None.  2) Examination of Body Systems comprised of:   Body structures and functions that impact the plan of care:      Hip.   Activity limitations that impact the plan of care are:      Bending, Running, Sitting, Sports and Squatting/kneeling.  3) Clinical presentation characteristics are:   Stable/Uncomplicated.  4) Decision-Making    Low complexity using standardized patient assessment instrument and/or measureable assessment of functional outcome.  Cumulative Therapy Evaluation is: Low complexity.    Previous and current functional limitations:  (See Goal Flow Sheet for this information)    Short term and Long term goals: (See Goal Flow Sheet for this information)     Communication ability:  Patient appears to be able to clearly communicate and understand verbal and written communication and follow directions correctly.  Treatment Explanation - The following has been discussed with the patient:   RX ordered/plan of care  Anticipated outcomes  Possible risks and side effects  This patient would benefit from PT intervention to resume normal activities.   Rehab potential is good.    Frequency:  2-3 X a month, once daily  Duration:  for 2 months  Discharge Plan:  Achieve all LTG.  Independent in home  treatment program.  Reach maximal therapeutic benefit.    Please refer to the daily flowsheet for treatment today, total treatment time and time spent performing 1:1 timed codes.

## 2021-02-04 ENCOUNTER — DOCUMENTATION ONLY (OUTPATIENT)
Dept: CARE COORDINATION | Facility: CLINIC | Age: 21
End: 2021-02-04

## 2021-02-09 ENCOUNTER — THERAPY VISIT (OUTPATIENT)
Dept: PHYSICAL THERAPY | Facility: CLINIC | Age: 21
End: 2021-02-09
Payer: COMMERCIAL

## 2021-02-09 DIAGNOSIS — M25.552 LEFT HIP PAIN: Primary | ICD-10-CM

## 2021-02-09 PROCEDURE — 97112 NEUROMUSCULAR REEDUCATION: CPT | Mod: GP | Performed by: PHYSICAL THERAPY ASSISTANT

## 2021-02-09 PROCEDURE — 97110 THERAPEUTIC EXERCISES: CPT | Mod: GP | Performed by: PHYSICAL THERAPY ASSISTANT

## 2021-02-18 ENCOUNTER — VIRTUAL VISIT (OUTPATIENT)
Dept: DERMATOLOGY | Facility: CLINIC | Age: 21
End: 2021-02-18
Payer: COMMERCIAL

## 2021-02-18 DIAGNOSIS — L70.0 ACNE VULGARIS: ICD-10-CM

## 2021-02-18 PROCEDURE — 99213 OFFICE O/P EST LOW 20 MIN: CPT | Mod: TEL | Performed by: DERMATOLOGY

## 2021-02-18 RX ORDER — CLINDAMYCIN PHOSPHATE 10 UG/ML
LOTION TOPICAL DAILY
Qty: 60 ML | Refills: 3 | Status: SHIPPED | OUTPATIENT
Start: 2021-02-18

## 2021-02-18 RX ORDER — TRETINOIN 0.25 MG/G
CREAM TOPICAL
Qty: 45 G | Refills: 3 | Status: SHIPPED | OUTPATIENT
Start: 2021-02-18

## 2021-02-18 ASSESSMENT — PAIN SCALES - GENERAL: PAINLEVEL: NO PAIN (0)

## 2021-02-18 NOTE — PROGRESS NOTES
[facility] Dermatology Note  Encounter Date: Feb 18, 2021  Store-and-Forward and Telephone ([phone]). Location of teledermatologist: [facility]MAT[facility] [place].  Start time: 11:30. End time: 11:40.    Dermatology Problem List:  1. Acne vulgaris, status-post Accutane  - s/p isotretioin 12,900mg (236.7mg/kg) discontinued 8/26/20    ____________________________________________    Assessment & Plan:   1. . Acne vulgaris, predominantly inflammatory.  Patient has completed a full course of isotretinoin, and is generally doing very well off medication for nearly 3 months.  Minor jawline flareups over the past 1-2 months.    Total dose to date is 12,900mg (goal dose was 12,000mg); has stopped medication     Discussed importance of ongoing gentle skin care, noncomedogenic moisturizers, cosmetic products    Continue tretinoin 0.025% cream at bedtime    Add clindamycin 1% lotion in the morning    Followup in approximately 6 months        Epi Lott MD  Dermatology Attending  ____________________________________________    CC: Derm Problem (Acne vulgaris - Laurie states she has been having breakout along her jawline)    HPI:  Ms. Laurie Morales is a(n) 20 year old female who presents today for followup on acne.  Now status post full course (>220mg/kg) isotretinoin.  Last visit 11/17/20, at that time she had finished accutane and our plan was to start tretinoin cream for minor flare ups.  Today she reports that overall her acne is much better than pre-accutane but that she has been having minor flare ups of bumps on right jawline and around mouth over past few months, despite using tretinoin at bedtime.  Wondering if we are able to add anything else topical/    Patient is otherwise feeling well, without additional concerns.    Labs:  NA    Physical Exam:  Vitals: There were no vitals taken for this visit.  SKIN: Teledermatology photos were reviewed; image quality and  interpretability: acceptable. Image date: see upload date.  - occasional pink 2mm papules and violaceous macules of right jawline  - No other lesions of concern on areas examined.     Medications:  Current Outpatient Medications   Medication     tretinoin (RETIN-A) 0.025 % external cream     VENTOLIN  (90 BASE) MCG/ACT Inhaler     TRI-LO-ESTARYLLA 0.18/0.215/0.25 MG-25 MCG per tablet     No current facility-administered medications for this visit.       Past Medical/Surgical History:   Patient Active Problem List   Diagnosis     Mild intermittent asthma     Impetigo     Goiter     Acne vulgaris     Encounter for long-term current use of high risk medication     Past Medical History:   Diagnosis Date     Nonsuppurative otitis media, not specified as acute or chronic 09/17/1999    Recurrent otitis media     Unspecified disease of respiratory system 09/17/1999    RAD       KESHA Oneal MD  No address on file on close of this encounter.

## 2021-02-18 NOTE — LETTER
2/18/2021       RE: Laurie Morales  [address] [facility] [id number]     Dear Colleague,    Thank you for referring your patient, Laurie Morales, to the [facility]MATOLOGY [facility] [place] at [facility]. Please see a copy of my visit note below.    [facility] Dermatology Note  Encounter Date: Feb 18, 2021  Store-and-Forward and Telephone ([phone]). Location of teledermatologist: [facility] [place].  Start time: 11:30. End time: 11:40.    Dermatology Problem List:  1. Acne vulgaris, status-post Accutane  - s/p isotretioin 12,900mg (236.7mg/kg) discontinued 8/26/20    ____________________________________________    Assessment & Plan:   1. . Acne vulgaris, predominantly inflammatory.  Patient has completed a full course of isotretinoin, and is generally doing very well off medication for nearly 3 months.  Minor jawline flareups over the past 1-2 months.    Total dose to date is 12,900mg (goal dose was 12,000mg); has stopped medication     Discussed importance of ongoing gentle skin care, noncomedogenic moisturizers, cosmetic products    Continue tretinoin 0.025% cream at bedtime    Add clindamycin 1% lotion in the morning    Followup in approximately 6 months        Epi Lott MD  Dermatology Attending  ____________________________________________    CC: Derm Problem (Acne vulgaris - Laurie states she has been having breakout along her jawline)    HPI:  Ms. Laurie Morales is a(n) 20 year old female who presents today for followup on acne.  Now status post full course (>220mg/kg) isotretinoin.  Last visit 11/17/20, at that time she had finished accutane and our plan was to start tretinoin cream for minor flare ups.  Today she reports that overall her acne is much better than pre-accutane but that she has been having minor flare ups of bumps on right jawline and around mouth over past few months,  despite using tretinoin at bedtime.  Wondering if we are able to add anything else topical/    Patient is otherwise feeling well, without additional concerns.    Labs:  NA    Physical Exam:  Vitals: There were no vitals taken for this visit.  SKIN: Teledermatology photos were reviewed; image quality and interpretability: acceptable. Image date: see upload date.  - occasional pink 2mm papules and violaceous macules of right jawline  - No other lesions of concern on areas examined.     Medications:  Current Outpatient Medications   Medication     tretinoin (RETIN-A) 0.025 % external cream     VENTOLIN  (90 BASE) MCG/ACT Inhaler     TRI-LO-ESTARYLLA 0.18/0.215/0.25 MG-25 MCG per tablet     No current facility-administered medications for this visit.       Past Medical/Surgical History:   Patient Active Problem List   Diagnosis     Mild intermittent asthma     Impetigo     Goiter     Acne vulgaris     Encounter for long-term current use of high risk medication     Past Medical History:   Diagnosis Date     Nonsuppurative otitis media, not specified as acute or chronic 09/17/1999    Recurrent otitis media     Unspecified disease of respiratory system 09/17/1999    STANISLAV Oneal MD  No address on file on close of this encounter.

## 2021-02-23 ENCOUNTER — THERAPY VISIT (OUTPATIENT)
Dept: PHYSICAL THERAPY | Facility: CLINIC | Age: 21
End: 2021-02-23
Payer: COMMERCIAL

## 2021-02-23 DIAGNOSIS — M25.552 LEFT HIP PAIN: Primary | ICD-10-CM

## 2021-02-23 PROCEDURE — 97110 THERAPEUTIC EXERCISES: CPT | Mod: GP | Performed by: PHYSICAL THERAPY ASSISTANT

## 2021-02-23 PROCEDURE — 97112 NEUROMUSCULAR REEDUCATION: CPT | Mod: GP | Performed by: PHYSICAL THERAPY ASSISTANT

## 2021-02-23 PROCEDURE — 97530 THERAPEUTIC ACTIVITIES: CPT | Mod: GP | Performed by: PHYSICAL THERAPY ASSISTANT

## 2021-10-23 ENCOUNTER — HEALTH MAINTENANCE LETTER (OUTPATIENT)
Age: 21
End: 2021-10-23

## 2022-02-12 ENCOUNTER — HEALTH MAINTENANCE LETTER (OUTPATIENT)
Age: 22
End: 2022-02-12

## 2022-10-09 ENCOUNTER — HEALTH MAINTENANCE LETTER (OUTPATIENT)
Age: 22
End: 2022-10-09

## 2023-03-25 ENCOUNTER — HEALTH MAINTENANCE LETTER (OUTPATIENT)
Age: 23
End: 2023-03-25

## 2024-05-25 ENCOUNTER — HEALTH MAINTENANCE LETTER (OUTPATIENT)
Age: 24
End: 2024-05-25